# Patient Record
Sex: MALE | Race: WHITE | URBAN - METROPOLITAN AREA
[De-identification: names, ages, dates, MRNs, and addresses within clinical notes are randomized per-mention and may not be internally consistent; named-entity substitution may affect disease eponyms.]

---

## 2017-04-25 ENCOUNTER — OFFICE VISIT (OUTPATIENT)
Dept: CARDIOLOGY | Facility: CLINIC | Age: 82
End: 2017-04-25
Attending: INTERNAL MEDICINE
Payer: MEDICARE

## 2017-04-25 VITALS
DIASTOLIC BLOOD PRESSURE: 62 MMHG | SYSTOLIC BLOOD PRESSURE: 126 MMHG | BODY MASS INDEX: 28.06 KG/M2 | HEART RATE: 56 BPM | WEIGHT: 196 LBS | HEIGHT: 70 IN

## 2017-04-25 DIAGNOSIS — G47.33 OBSTRUCTIVE SLEEP APNEA SYNDROME: ICD-10-CM

## 2017-04-25 DIAGNOSIS — E78.2 MIXED HYPERLIPIDEMIA: ICD-10-CM

## 2017-04-25 DIAGNOSIS — I10 BENIGN ESSENTIAL HYPERTENSION: ICD-10-CM

## 2017-04-25 DIAGNOSIS — I49.3 PVC'S (PREMATURE VENTRICULAR CONTRACTIONS): Primary | ICD-10-CM

## 2017-04-25 PROCEDURE — 99214 OFFICE O/P EST MOD 30 MIN: CPT | Performed by: INTERNAL MEDICINE

## 2017-04-25 RX ORDER — CHLORTHALIDONE 50 MG/1
25 TABLET ORAL DAILY
COMMUNITY

## 2017-04-25 RX ORDER — SIMVASTATIN 10 MG
10 TABLET ORAL AT BEDTIME
COMMUNITY

## 2017-04-25 RX ORDER — LOSARTAN POTASSIUM 100 MG/1
100 TABLET ORAL DAILY
COMMUNITY

## 2017-04-25 NOTE — MR AVS SNAPSHOT
After Visit Summary   4/25/2017    Adryan Avila    MRN: [id number]           Patient Information     Date Of Birth          1935        Visit Information        Provider Department      4/25/2017 9:15 AM Miguel Ángel Watkins MD [facility] [place]        Today's Diagnoses     PVC's (premature ventricular contractions)    -  1    Benign essential hypertension        Mixed hyperlipidemia        Obstructive sleep apnea syndrome           Follow-ups after your visit        Additional Services     Follow-Up with Cardiologist                 Your next 10 appointments already scheduled     Dec 20, 2017  9:30 AM CST   Return Sleep Patient with Star Cortes MD   [facility] ([facility])    [address] [address] [phone]              Future tests that were ordered for you today     Open Future Orders        Priority Expected Expires Ordered    Holter Monitor 24 hour - Adult Routine 4/25/2018 5/30/2018 4/25/2017    Echocardiogram Routine 4/25/2018 5/30/2018 4/25/2017    Follow-Up with Cardiologist Routine 4/25/2018 9/7/2018 4/25/2017            Who to contact     If you have questions or need follow up information about today's clinic visit or your schedule please contact [facility] [place] directly at [phone].  Normal or non-critical lab and imaging results will be communicated to you by MyChart, letter or phone within 4 business days after the clinic has received the results. If you do not hear from us within 7 days, please contact the clinic through MyChart or phone. If you have a critical or abnormal lab result, we will notify you by phone as soon as possible.  Submit refill requests through [employer] or call your pharmacy and they will forward the refill request to us. Please allow 3 business days for your refill to be completed.          Additional  "Information About Your Visit        MyChart Information     [employer] lets you send messages to your doctor, view your test results, renew your prescriptions, schedule appointments and more. To sign up, go to [email or web address] . Click on \"Log in\" on the left side of the screen, which will take you to the Welcome page. Then click on \"Sign up Now\" on the right side of the page.     You will be asked to enter the access code listed below, as well as some personal information. Please follow the directions to create your username and password.     Your access code is: [email or web address]  Expires: 2017  9:33 AM     Your access code will  in 90 days. If you need help or a new code, please call your [place] clinic or [phone].        Care EveryWhere ID     This is your Care EveryWhere ID. This could be used by other organizations to access your [place] medical records  [id number]        Your Vitals Were     Pulse Height BMI (Body Mass Index)             56 1.778 m (5' 10\") 28.12 kg/m2          Blood Pressure from Last 3 Encounters:   17 126/62   16 133/75   16 128/66    Weight from Last 3 Encounters:   17 88.9 kg (196 lb)   16 89.1 kg (196 lb 6.4 oz)   16 89 kg (196 lb 3.2 oz)              We Performed the Following     Follow-Up with Cardiologist        Primary Care Provider Office Phone # Fax #    Martha Charles [phone]       [facility] [address] [facility] [address]        Thank you!     Thank you for choosing [facility] AT [place]  for your care. Our goal is always to provide you with excellent care. Hearing back from our patients is one way we can continue to improve our services. Please take a few minutes to complete the written survey that you may receive in the mail after your visit with us. Thank you!             Your Updated Medication List - Protect others around you: Learn how to safely " use, store and throw away your medicines at www.disposemymeds.org.          This list is accurate as of: 4/25/17  9:33 AM.  Always use your most recent med list.                   Brand Name Dispense Instructions for use    allopurinol 100 MG tablet    ZYLOPRIM     Take 200 mg by mouth daily       amLODIPine 5 MG tablet    NORVASC     Take 5 mg by mouth daily       ASPIRIN EC PO      Take 81 mg by mouth daily       chlorthalidone 50 MG tablet    HYGROTON     Take 25 mg by mouth daily       losartan 100 MG tablet    COZAAR     Take 100 mg by mouth daily       order for DME      DREAMSTATION 5-15 CM/H20 NASAL WISP XL       potassium chloride vinod er    K-DUR     Take 40 mEq by mouth daily 2 x 20 mEq       simvastatin 10 MG tablet    ZOCOR     Take 10 mg by mouth At Bedtime       VITAMIN A PO      Take 10,000 Units by mouth daily

## 2017-04-25 NOTE — PROGRESS NOTES
HISTORY OF PRESENT ILLNESS:  Adryan Avila in followup for his frequent premature ventricular complexes.  He was noticed that last year and had pseudobradycardia with it.  Subsequently, he was referred for a sleep study which showed sleep apnea.  He is now on CPAP mask but he is not using it consistently.  I reinforced the importance of using CPAP mask more consistently as it will decrease the risk of cardiovascular complications down the road.  He has a new mask now and he is hoping to use it more frequently.  He was having some trouble getting to sleep and with Trazodone, this has improved.        His PVCs were also evaluated by Dr. Salinas in the Electrophysiology Service on my request.  He offered an ablation which was performed last year.  The origin of PVCs appeared to be from the posterior papillary muscle region and partial success was obtained.  Now the Holter last year after the ablation had shown 13% burden of PVCs compared to 30% prior to the ablation.  The patient remains asymptomatic.  He walks about 3 miles 3-4 times a week and has had no significant limitations.      His blood pressure is well controlled on losartan and amlodipine as well as chlorthalidone.  He follows with his primary care physician for his labs.  He is on simvastatin for high cholesterol.      PHYSICAL EXAMINATION:   VITAL SIGNS:  Blood pressure 126/62, pulse 56 per minute, slightly irregular because of PVCs.     CARDIOVASCULAR:   No murmurs were heard.   CHEST:  Clear to auscultation.      IMPRESSION:   1.  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 Salinas.  Partial success, the burden of PVCs has decreased from 30% to 13%.  He remains asymptomatic.  I encouraged him to continue using CPAP therapy as directed.  I would suggest we repeat a Holter next year to assess the burden of PVCs as well as an echocardiogram to make sure there is no evidence of cardiomyopathy related to frequent PVCs.  He will return  to see me at that time.   2.  Sleep apnea as above.  I encouraged him to use CPAP as much as possible.   3.  Hypertension.  Continue present medications.  He will follow with me in a year.      cc:      LIDA Carrasco   [address] [address]         SRINATH ASHBY MD             D: 2017 09:45   T: 2017 17:57   MT: RUBIA      Name:     LADARIUS GOMEZ   MRN:      [id number]        Account:      [id number]   :      1935           Service Date: 2017      Document: [id number]

## 2017-04-25 NOTE — PROGRESS NOTES
HPI and Plan:   See dictation  [id number]    Orders Placed This Encounter   Procedures     Follow-Up with Cardiologist     Holter Monitor 24 hour - Adult     Echocardiogram       Orders Placed This Encounter   Medications     chlorthalidone (HYGROTON) 50 MG tablet     Sig: Take 25 mg by mouth daily     losartan (COZAAR) 100 MG tablet     Sig: Take 100 mg by mouth daily     simvastatin (ZOCOR) 10 MG tablet     Sig: Take 10 mg by mouth At Bedtime       Medications Discontinued During This Encounter   Medication Reason     CHLORTHALIDONE PO Therapy completed     LOSARTAN POTASSIUM PO Therapy completed     Multiple Vitamins-Minerals ( MACULAR HEALTH PO) Therapy completed     SIMVASTATIN PO Therapy completed         Encounter Diagnoses   Name Primary?     PVC's (premature ventricular contractions) Yes     Benign essential hypertension      Mixed hyperlipidemia      Obstructive sleep apnea syndrome        CURRENT MEDICATIONS:  Current Outpatient Prescriptions   Medication Sig Dispense Refill     chlorthalidone (HYGROTON) 50 MG tablet Take 25 mg by mouth daily       losartan (COZAAR) 100 MG tablet Take 100 mg by mouth daily       simvastatin (ZOCOR) 10 MG tablet Take 10 mg by mouth At Bedtime       allopurinol (ZYLOPRIM) 100 MG tablet Take 200 mg by mouth daily       amLODIPine (NORVASC) 5 MG tablet Take 5 mg by mouth daily       order for DME DREAMSTATION  5-15 CM/H20  NASAL WISP XL       potassium chloride vinod er (K-DUR) Take 40 mEq by mouth daily 2 x 20 mEq       VITAMIN A PO Take 10,000 Units by mouth daily       ASPIRIN EC PO Take 81 mg by mouth daily       [DISCONTINUED] LOSARTAN POTASSIUM PO Take 100 mg by mouth daily       [DISCONTINUED] SIMVASTATIN PO Take 10 mg by mouth At Bedtime        [DISCONTINUED] CHLORTHALIDONE PO Take 50 mg by mouth daily 2 x 25 mg         ALLERGIES   No Known Allergies    PAST MEDICAL HISTORY:  Past Medical History:   Diagnosis Date     Hypercholesteremia      Hypertension        PAST  "SURGICAL HISTORY:  Past Surgical History:   Procedure Laterality Date     ENT SURGERY      perforated ear drum 1985     HERNIA REPAIR         FAMILY HISTORY:  Family History   Problem Relation Age of Onset     Family History Negative Mother      Other - See Comments Father      pulmonary rosanne       SOCIAL HISTORY:  Social History     Social History     Marital status:      Spouse name: N/A     Number of children: N/A     Years of education: N/A     Social History Main Topics     Smoking status: Former Smoker     Packs/day: 0.50     Years: 20.00     Types: Cigarettes     Smokeless tobacco: Never Used      Comment: quit 1970     Alcohol use No     Drug use: No     Sexual activity: Not Asked     Other Topics Concern     Parent/Sibling W/ Cabg, Mi Or Angioplasty Before 65f 55m? No     Caffeine Concern No     coffee: 2 cups a day     Sleep Concern No     Stress Concern No     Weight Concern No     Special Diet No     Exercise Yes     walking 3 miles 5-6 days a week     Seat Belt Yes     Social History Narrative       Review of Systems:  Skin:  Negative       Eyes:  Negative      ENT:  Negative      Respiratory:  Positive for sleep apnea has only used the CPAP 3 times in the last 3 months    Cardiovascular:  Negative      Gastroenterology: Negative      Genitourinary:  Negative      Musculoskeletal:  Negative      Neurologic:  Negative      Psychiatric:  Negative      Heme/Lymph/Imm:  Negative      Endocrine:  Negative        Physical Exam:  Vitals: /62  Pulse 56  Ht 1.778 m (5' 10\")  Wt 88.9 kg (196 lb)  BMI 28.12 kg/m2    Constitutional:  cooperative;alert and oriented        Skin:  warm and dry to the touch        Head:  normocephalic        Eyes:  pupils equal and round        ENT:  no pallor or cyanosis, dentition good        Neck:  carotid pulses are full and equal bilaterally, JVP normal, no carotid bruit, no thyromegaly        Chest:  clear to auscultation          Cardiac: normal S1 and S2 " occasional premature beats   no presence of murmur            Abdomen:  abdomen soft, non-tender, BS normoactive, no mass, no HSM, no bruits        Vascular: pulses full and equal, no bruits auscultated                                        Extremities and Back:  no deformities, clubbing, cyanosis, erythema observed              Neurological:  affect appropriate, oriented to time, person and place              CC  Miguel Ángel Watkins MD   [facility]  [address]  [address]  [place] [address]

## 2017-04-25 NOTE — LETTER
4/25/2017    Martha Charles MD  [facility]   [address] [facility] [address]    RE: Adryan Jamauire       Dear Colleague,    I had the pleasure of seeing Adryan Avila in the [facility].    Adryan Avila in followup for his frequent premature ventricular complexes.  He was noticed that last year and had pseudobradycardia with it.  Subsequently, he was referred for a sleep study which showed sleep apnea.  He is now on CPAP mask but he is not using it consistently.  I reinforced the importance of using CPAP mask more consistently as it will decrease the risk of cardiovascular complications down the road.  He has a new mask now and he is hoping to use it more frequently.  He was having some trouble getting to sleep and with Trazodone, this has improved.        His PVCs were also evaluated by Dr. Salinas in the Electrophysiology Service on my request.  He offered an ablation which was performed last year.  The origin of PVCs appeared to be from the posterior papillary muscle region and partial success was obtained.  Now the Holter last year after the ablation had shown 13% burden of PVCs compared to 30% prior to the ablation.  The patient remains asymptomatic.  He walks about 3 miles 3-4 times a week and has had no significant limitations.      His blood pressure is well controlled on losartan and amlodipine as well as chlorthalidone.  He follows with his primary care physician for his labs.  He is on simvastatin for high cholesterol.      PHYSICAL EXAMINATION:   VITAL SIGNS:  Blood pressure 126/62, pulse 56 per minute, slightly irregular because of PVCs.     CARDIOVASCULAR:   No murmurs were heard.   CHEST:  Clear to auscultation.     Outpatient Encounter Prescriptions as of 4/25/2017   Medication Sig Dispense Refill     chlorthalidone (HYGROTON) 50 MG tablet Take 25 mg by mouth daily       losartan (COZAAR) 100 MG tablet Take 100 mg by mouth daily       simvastatin  (ZOCOR) 10 MG tablet Take 10 mg by mouth At Bedtime       allopurinol (ZYLOPRIM) 100 MG tablet Take 200 mg by mouth daily       amLODIPine (NORVASC) 5 MG tablet Take 5 mg by mouth daily       order for DME DREAMSTATION  5-15 CM/H20  NASAL WISP XL       potassium chloride vinod er (K-DUR) Take 40 mEq by mouth daily 2 x 20 mEq       VITAMIN A PO Take 10,000 Units by mouth daily       ASPIRIN EC PO Take 81 mg by mouth daily       [DISCONTINUED] LOSARTAN POTASSIUM PO Take 100 mg by mouth daily       [DISCONTINUED] SIMVASTATIN PO Take 10 mg by mouth At Bedtime        [DISCONTINUED] CHLORTHALIDONE PO Take 50 mg by mouth daily 2 x 25 mg       [DISCONTINUED] Multiple Vitamins-Minerals ( MACULAR HEALTH PO) Take 2 capsules by mouth daily Focus [employer] eye vitamin brand       No facility-administered encounter medications on file as of 4/25/2017.       IMPRESSION:   1.  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 Salinas.  Partial success, the burden of PVCs has decreased from 30% to 13%.  He remains asymptomatic.  I encouraged him to continue using CPAP therapy as directed.  I would suggest we repeat a Holter next year to assess the burden of PVCs as well as an echocardiogram to make sure there is no evidence of cardiomyopathy related to frequent PVCs.  He will return to see me at that time.   2.  Sleep apnea as above.  I encouraged him to use CPAP as much as possible.   3.  Hypertension.  Continue present medications.  He will follow with me in a year.     Again, thank you for allowing me to participate in the care of your patient.      Sincerely,    Miguel Ángel Watkins MD     [facility]

## 2017-05-26 ENCOUNTER — DOCUMENTATION ONLY (OUTPATIENT)
Dept: SLEEP MEDICINE | Facility: CLINIC | Age: 82
End: 2017-05-26

## 2017-05-26 NOTE — PROGRESS NOTES
6 Month [facility] visit    Subjective measures:  Pt states things are going well.  Pt is benefiting from therapy.  He is having some issues with dry mouth.      Objective measures: 14 day rolling measures     Device settings:    EPAP Min Auto CPAP: 5 (CPAP Min Auto CPAP)    EPAP Max Auto CPAP: 15 (CPAP Max Auto CPAP)    Avg EPAP pressure (90th %ile) 14 day average (Kenan): 8.7cm H20    Objective measures: 14 day rolling measures      Compliance   (Goal >70%)  % compliance greater than four hours rolling average 14 days: 64 %      Leak   (Goal < 10%)  Average % of night in large leak Rolling Average 14 days (KENAN): 1%       AHI  (Goal < 5)  AHI Rolling Average 14 Day: 1.5      Usage  (Goal >240)  Time mask on face 14 day average: 289 min    Assessment:Pt not meeting objective benchmarks for compliance (only a little shy) Patient failing following subjective benchmarks:dryness or soreness  Action plan: Increased humidity remotely and instructed pt to call if he continues to have issues with dryness.  pt to follow up per provider request (1-2 yrs)

## 2017-06-26 ENCOUNTER — TRANSFERRED RECORDS (OUTPATIENT)
Dept: CARDIOLOGY | Facility: CLINIC | Age: 82
End: 2017-06-26

## 2017-06-27 ENCOUNTER — TELEPHONE (OUTPATIENT)
Dept: CARDIOLOGY | Facility: CLINIC | Age: 82
End: 2017-06-27

## 2017-06-27 NOTE — TELEPHONE ENCOUNTER
Pt called in with c/o low heart rate he said when he took pulse it was only 32-35. Pt said he went to ER at [facility] and they monitored him for two hours. Per Pt they said he looked about the same as what had been documented in chart. Informed Pt  That if he has a fair amout of  PVC's it will be hard to get accurate HR and will probable look like it is low. Informed Pt will try to get records frow [facility]. ZEINAB Balderas rN

## 2017-06-27 NOTE — TELEPHONE ENCOUNTER
Called Pt informed him per records from [facility] his HR ventricular rate 55. Offered Pt appointment with EP and he declined. Pt said he felt fine and now that hew knew it is hard to get accurate HR with PVC's he was not so concerned. Records sent to curt. ZEINAB Balderas RN

## 2017-12-04 DIAGNOSIS — G47.33 OBSTRUCTIVE SLEEP APNEA SYNDROME: Primary | ICD-10-CM

## 2017-12-11 ENCOUNTER — OFFICE VISIT (OUTPATIENT)
Dept: CARDIOLOGY | Facility: CLINIC | Age: 82
End: 2017-12-11
Attending: INTERNAL MEDICINE
Payer: MEDICARE

## 2017-12-11 VITALS
SYSTOLIC BLOOD PRESSURE: 104 MMHG | DIASTOLIC BLOOD PRESSURE: 64 MMHG | HEIGHT: 70 IN | HEART RATE: 60 BPM | WEIGHT: 184 LBS | BODY MASS INDEX: 26.34 KG/M2

## 2017-12-11 DIAGNOSIS — I49.3 PVC'S (PREMATURE VENTRICULAR CONTRACTIONS): ICD-10-CM

## 2017-12-11 PROCEDURE — 99213 OFFICE O/P EST LOW 20 MIN: CPT | Performed by: PHYSICIAN ASSISTANT

## 2017-12-11 PROCEDURE — 93000 ELECTROCARDIOGRAM COMPLETE: CPT | Performed by: PHYSICIAN ASSISTANT

## 2017-12-11 NOTE — LETTER
"12/11/2017    Martha Charles MD  [facility]   [address] [facility] [address]    RE: Adryan Avila       Dear Colleague,    I had the pleasure of seeing Adryan HORAN Austin in the [facility].    HISTORY OF PRESENT ILLNESS:  I had the pleasure of seeing Manav today when he came for routine followup.  He is a very pleasant 82 year old who sees Dr. Watkins routinely.  Dr. Watkins sent him to Dr. Salinas for frequent PVCs associated with pseudobradycardia.  Dr. Salinas took him to the EP Lab 11/14/2016.  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  Followup Holter monitor showed a reduction in his PVC burden from 34% down to 13%. As he was doing well, no further ablation was planned, and he has continued routine followup.     He has sleep apnea and when Dr. Watkins saw him back in April he encouraged him to use his CPAP more often.  He has done so despite the fact he \"hates it.\"  He now uses his CPAP roughly 6-7 nights per week.       Manav tells me that he has done well since he last saw Dr. Watkins.  Specifically, he continues to walk 3 miles every day without any limitation.  He denies edema, orthopnea or PND.  He does note some mild orthostasis, but has never had any dizziness or lightheadedness once he starts moving or while sitting.  He has not had any syncope or falls.  Overall, he is doing well with no complaints.      EKG today, which I overread, showed a sinus rhythm of 67 beats per minute.  He had 1 PAC noted.  I did not see any PVCs.     Outpatient Encounter Prescriptions as of 12/11/2017   Medication Sig Dispense Refill     chlorthalidone (HYGROTON) 50 MG tablet Take 25 mg by mouth daily       losartan (COZAAR) 100 MG tablet Take 100 mg by mouth daily       simvastatin (ZOCOR) 10 MG tablet Take 10 mg by mouth At Bedtime       allopurinol (ZYLOPRIM) 100 MG tablet Take 200 mg by mouth " daily       amLODIPine (NORVASC) 5 MG tablet Take 5 mg by mouth daily       order for DME DREAMSTATION  5-15 CM/H20  NASAL WISP XL       potassium chloride vinod er (K-DUR) Take 40 mEq by mouth daily 2 x 20 mEq       VITAMIN A PO Take 10,000 Units by mouth daily       ASPIRIN EC PO Take 81 mg by mouth daily       No facility-administered encounter medications on file as of 12/11/2017.       ASSESSMENT AND PLAN:     1.  PVCs.  PVC burden improved after Dr. Salinas took him to the lab and ablated posterolateral papillary muscle PVC foci.  He has had some residual PVCs with the same morphology, but really is doing well.      He is seeing Dr. Watkins in 04/2018 and will get a Holter monitor and echocardiogram at that time.  Certainly, EP would be able to see him if needed.       2.  Weight loss.  This has been intentional with a reduction in caloric intake.     3.  Hypertension.  Blood pressure is actually on the low side, but he is doing well with it.  He has some mild orthostasis, but overall is stable and steady.     4.  Obstructive sleep apnea.  I have encouraged him to continue using the CPAP, as I do feel this contributed to an improvement in his ectopy.     Sincerely,    Sherri Stover PA-C     [facility]

## 2017-12-11 NOTE — MR AVS SNAPSHOT
After Visit Summary   12/11/2017    Adryan Avila    MRN: [id number]           Patient Information     Date Of Birth          1935        Visit Information        Provider Department      12/11/2017 10:30 AM Sherri Stover PA-C [facility]        Today's Diagnoses     PVC's (premature ventricular contractions)          Care Instructions    1. EKG today showed normal rhythm with VERY FEW funny heart beats!    2. See Dr. Watkins when you return from [place]  - he wants to get 24 hour Monitor and echocardiogram and see you 4/2018.  CALL if any issues beforehand!  [phone]    3. Keep using CPAP!!!          Follow-ups after your visit        Your next 10 appointments already scheduled     Dec 20, 2017  9:30 AM CST   Return Sleep Patient with Star oCrtes MD   [facility] ([facility])    [address] [id number] [phone]              Who to contact     If you have questions or need follow up information about today's clinic visit or your schedule please contact [facility] directly at [phone].  Normal or non-critical lab and imaging results will be communicated to you by MyChart, letter or phone within 4 business days after the clinic has received the results. If you do not hear from us within 7 days, please contact the clinic through MyChart or phone. If you have a critical or abnormal lab result, we will notify you by phone as soon as possible.  Submit refill requests through [employer] or call your pharmacy and they will forward the refill request to us. Please allow 3 business days for your refill to be completed.          Additional Information About Your Visit        [employer] Information     [employer] lets you send messages to your doctor, view your test results, renew your prescriptions, schedule appointments and more. To sign up, go to  [email or web address] . Click on \"Log in\" on the left side of the screen, which will take you to the Welcome page. Then click on \"Sign up Now\" on the right side of the page.     You will be asked to enter the access code listed below, as well as some personal information. Please follow the directions to create your username and password.     Your access code is: [email or web address]  Expires: 3/11/2018 10:44 AM     Your access code will  in 90 days. If you need help or a new code, please call your [place] clinic or [phone].        Care EveryWhere ID     This is your Care EveryWhere ID. This could be used by other organizations to access your [place] medical records  [id number]        Your Vitals Were     Pulse Height BMI (Body Mass Index)             60 1.778 m (5' 10\") 26.4 kg/m2          Blood Pressure from Last 3 Encounters:   17 104/64   17 126/62   16 133/75    Weight from Last 3 Encounters:   17 83.5 kg (184 lb)   17 88.9 kg (196 lb)   16 89.1 kg (196 lb 6.4 oz)              We Performed the Following     EKG 12-lead complete w/read - Clinics (performed today)     Follow-Up with Cardiac Advanced Practice Provider        Primary Care Provider Office Phone # Fax #    Martha Charles [phone]       [place] [address] [facility] [address]        Equal Access to Services     [facility] : Hadii naveen saxena hadasho Sohelena, waaxda luqadaha, qaybta kaalmada maty, magy boudreaux . So [facility] [phone].    ATENCIÓN: Si habla español, tiene a white disposición servicios gratuitos de asistencia lingüística. Llame al [phone].    We comply with applicable federal civil rights laws and [place] laws. We do not discriminate on the basis of race, color, national origin, age, disability, sex, sexual orientation, or gender identity.            Thank you!     Thank you for choosing [facility]   [place]  for your care. Our goal is always to provide you with excellent care. Hearing back from our patients is one way we can continue to improve our services. Please take a few minutes to complete the written survey that you may receive in the mail after your visit with us. Thank you!             Your Updated Medication List - Protect others around you: Learn how to safely use, store and throw away your medicines at www.disposemymeds.org.          This list is accurate as of: 12/11/17 10:44 AM.  Always use your most recent med list.                   Brand Name Dispense Instructions for use Diagnosis    allopurinol 100 MG tablet    ZYLOPRIM     Take 200 mg by mouth daily        amLODIPine 5 MG tablet    NORVASC     Take 5 mg by mouth daily        ASPIRIN EC PO      Take 81 mg by mouth daily        chlorthalidone 50 MG tablet    HYGROTON     Take 25 mg by mouth daily        losartan 100 MG tablet    COZAAR     Take 100 mg by mouth daily        order for DME      DREAMSTATION 5-15 CM/H20 NASAL WISP XL        potassium chloride vinod er    K-DUR     Take 40 mEq by mouth daily 2 x 20 mEq        simvastatin 10 MG tablet    ZOCOR     Take 10 mg by mouth At Bedtime        VITAMIN A PO      Take 10,000 Units by mouth daily

## 2017-12-11 NOTE — PROGRESS NOTES
"HISTORY OF PRESENT ILLNESS:  I had the pleasure of seeing Manav today when he came for routine followup.  He is a very pleasant 82 year old who sees Dr. Watkins routinely.  Dr. Watkins sent him to Dr. Salinas for frequent PVCs associated with pseudobradycardia.  Dr. Salinas took him to the EP Lab 11/14/2016.  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  Followup Holter monitor showed a reduction in his PVC burden from 34% down to 13%. As he was doing well, no further ablation was planned, and he has continued routine followup.     He has sleep apnea and when Dr. Watkins saw him back in April he encouraged him to use his CPAP more often.  He has done so despite the fact he \"hates it.\"  He now uses his CPAP roughly 6-7 nights per week.       Manav tells me that he has done well since he last saw Dr. Watkins.  Specifically, he continues to walk 3 miles every day without any limitation.  He denies edema, orthopnea or PND.  He does note some mild orthostasis, but has never had any dizziness or lightheadedness once he starts moving or while sitting.  He has not had any syncope or falls.  Overall, he is doing well with no complaints.      EKG today, which I overread, showed a sinus rhythm of 67 beats per minute.  He had 1 PAC noted.  I did not see any PVCs.        ASSESSMENT AND PLAN:     1.  PVCs.  PVC burden improved after Dr. Salinas took him to the lab and ablated posterolateral papillary muscle PVC foci.  He has had some residual PVCs with the same morphology, but really is doing well.      He is seeing Dr. Watkins in 04/2018 and will get a Holter monitor and echocardiogram at that time.  Certainly, EP would be able to see him if needed.       2.  Weight loss.  This has been intentional with a reduction in caloric intake.     3.  Hypertension.  Blood pressure is actually on the low side, but he is doing well with it.  He has some mild orthostasis, but " overall is stable and steady.     4.  Obstructive sleep apnea.  I have encouraged him to continue using the CPAP, as I do feel this contributed to an improvement in his ectopy.         JO SIMMONS PA-C             D: 2017 10:56   T: 2017 11:44   MT: edison      Name:     LADARIUS GOMEZ   MRN:      [id number]        Account:      [id number]   :      1935           Service Date: 2017      Document: [id number]

## 2017-12-11 NOTE — PROGRESS NOTES
HPI and Plan:   See dictation #[id number]    Orders Placed This Encounter   Procedures     EKG 12-lead complete w/read - Clinics (performed today)       No orders of the defined types were placed in this encounter.      There are no discontinued medications.      Encounter Diagnosis   Name Primary?     PVC's (premature ventricular contractions)        CURRENT MEDICATIONS:  Current Outpatient Prescriptions   Medication Sig Dispense Refill     chlorthalidone (HYGROTON) 50 MG tablet Take 25 mg by mouth daily       losartan (COZAAR) 100 MG tablet Take 100 mg by mouth daily       simvastatin (ZOCOR) 10 MG tablet Take 10 mg by mouth At Bedtime       allopurinol (ZYLOPRIM) 100 MG tablet Take 200 mg by mouth daily       amLODIPine (NORVASC) 5 MG tablet Take 5 mg by mouth daily       order for DME DREAMSTATION  5-15 CM/H20  NASAL WISP XL       potassium chloride vinod er (K-DUR) Take 40 mEq by mouth daily 2 x 20 mEq       VITAMIN A PO Take 10,000 Units by mouth daily       ASPIRIN EC PO Take 81 mg by mouth daily         ALLERGIES   No Known Allergies    PAST MEDICAL HISTORY:  Past Medical History:   Diagnosis Date     Hypercholesteremia      Hypertension        PAST SURGICAL HISTORY:  Past Surgical History:   Procedure Laterality Date     ENT SURGERY      perforated ear drum 1985     HERNIA REPAIR         FAMILY HISTORY:  Family History   Problem Relation Age of Onset     Family History Negative Mother      Other - See Comments Father      pulmonary rosanne       SOCIAL HISTORY:  Social History     Social History     Marital status:      Spouse name: N/A     Number of children: N/A     Years of education: N/A     Social History Main Topics     Smoking status: Former Smoker     Packs/day: 0.50     Years: 20.00     Types: Cigarettes     Smokeless tobacco: Never Used      Comment: quit 1970     Alcohol use No     Drug use: No     Sexual activity: Not Asked     Other Topics Concern     Parent/Sibling W/ Cabg, Mi Or Angioplasty  "Before 65f 55m? No     Caffeine Concern No     coffee: 2 cups a day     Sleep Concern No     Stress Concern No     Weight Concern No     Special Diet No     Exercise Yes     walking 3 miles 5-6 days a week     Seat Belt Yes     Social History Narrative       Review of Systems:  Skin:  Negative       Eyes:  Negative      ENT:  Negative      Respiratory:  Positive for sleep apnea;CPAP     Cardiovascular:  Negative for;palpitations;chest pain;lightheadedness;dizziness;edema;exercise intolerance;fatigue;syncope or near-syncope      Gastroenterology: Negative      Genitourinary:  Negative      Musculoskeletal:  Negative      Neurologic:  Negative      Psychiatric:  Negative      Heme/Lymph/Imm:  Negative      Endocrine:  Negative        Physical Exam:  Vitals: /64  Pulse 60  Ht 1.778 m (5' 10\")  Wt 83.5 kg (184 lb)  BMI 26.4 kg/m2    Constitutional:  cooperative, alert and oriented, well developed, well nourished, in no acute distress        Skin:  warm and dry to the touch          Head:  normocephalic        Eyes:  pupils equal and round;conjunctivae and lids unremarkable;sclera white;no xanthalasma        Lymph:      ENT:  no pallor or cyanosis, dentition good        Neck:           Respiratory:  clear to auscultation         Cardiac: normal S1 and S2;regular rhythm;no S3 or S4;no murmurs, gallops or rubs detected     no presence of murmur          pulses full and equal                                        GI:  abdomen soft        Extremities and Muscular Skeletal:  no deformities, clubbing, cyanosis, erythema observed;no edema              Neurological:  no gross motor deficits        Psych:  Alert and Oriented x 3                "

## 2017-12-11 NOTE — PATIENT INSTRUCTIONS
1. EKG today showed normal rhythm with VERY FEW funny heart beats!    2. See Dr. Watkins when you return from [place]  - he wants to get 24 hour Monitor and echocardiogram and see you 4/2018.  CALL if any issues beforehand!  [phone]    3. Keep using CPAP!!!

## 2017-12-15 ENCOUNTER — DOCUMENTATION ONLY (OUTPATIENT)
Dept: CARDIOLOGY | Facility: CLINIC | Age: 82
End: 2017-12-15

## 2017-12-15 NOTE — PROGRESS NOTES
Carrillo Watkins!     Just FYI - I saw Mr. Avila in routine FU for Dr. Salinas. He's s/p partially successful PVC ablation. Continues to feel great and walks 3 miles per day.    Last time you saw him you rec'd he see you back in 4/2018 with repeat Holter and echo.    Pls let us know when/if you would like EP to see again.    Emily Tomas

## 2017-12-20 ENCOUNTER — OFFICE VISIT (OUTPATIENT)
Dept: SLEEP MEDICINE | Facility: CLINIC | Age: 82
End: 2017-12-20
Payer: MEDICARE

## 2017-12-20 VITALS
DIASTOLIC BLOOD PRESSURE: 63 MMHG | OXYGEN SATURATION: 97 % | HEART RATE: 56 BPM | BODY MASS INDEX: 26.63 KG/M2 | SYSTOLIC BLOOD PRESSURE: 122 MMHG | HEIGHT: 70 IN | WEIGHT: 186 LBS | RESPIRATION RATE: 16 BRPM

## 2017-12-20 DIAGNOSIS — G47.33 OSA (OBSTRUCTIVE SLEEP APNEA): Primary | ICD-10-CM

## 2017-12-20 PROCEDURE — 99213 OFFICE O/P EST LOW 20 MIN: CPT | Performed by: INTERNAL MEDICINE

## 2017-12-20 NOTE — MR AVS SNAPSHOT
After Visit Summary   12/20/2017    Adryan Avila    MRN: [id number]           Patient Information     Date Of Birth          1935        Visit Information        Provider Department      12/20/2017 9:30 AM Star Cortes MD [facility]        Today's Diagnoses     EZEQUIEL (obstructive sleep apnea)    -  1      Care Instructions      Your BMI is Body mass index is 26.69 kg/(m^2).  Weight management is a personal decision.  If you are interested in exploring weight loss strategies, the following discussion covers the approaches that may be successful. Body mass index (BMI) is one way to tell whether you are at a healthy weight, overweight, or obese. It measures your weight in relation to your height.  A BMI of 18.5 to 24.9 is in the healthy range. A person with a BMI of 25 to 29.9 is considered overweight, and someone with a BMI of 30 or greater is considered obese. More than two-thirds of American adults are considered overweight or obese.  Being overweight or obese increases the risk for further weight gain. Excess weight may lead to heart disease and diabetes.  Creating and following plans for healthy eating and physical activity may help you improve your health.  Weight control is part of healthy lifestyle and includes exercise, emotional health, and healthy eating habits. Careful eating habits lifelong are the mainstay of weight control. Though there are significant health benefits from weight loss, long-term weight loss with diet alone may be very difficult to achieve- studies show long-term success with dietary management in less than 10% of people. Attaining a healthy weight may be especially difficult to achieve in those with severe obesity. In some cases, medications, devices and surgical management might be considered.  What can you do?  If you are overweight or obese and are interested in methods for weight loss, you should discuss this with your provider.     Consider  reducing daily calorie intake by 500 calories.     Keep a food journal.     Avoiding skipping meals, consider cutting portions instead.    Diet combined with exercise helps maintain muscle while optimizing fat loss. Strength training is particularly important for building and maintaining muscle mass. Exercise helps reduce stress, increase energy, and improves fitness. Increasing exercise without diet control, however, may not burn enough calories to loose weight.       Start walking three days a week 10-20 minutes at a time    Work towards walking thirty minutes five days a week     Eventually, increase the speed of your walking for 1-2 minutes at time    In addition, we recommend that you review healthy lifestyles and methods for weight loss available through the National Institutes of Health patient information sites:  http://win.niddk.nih.gov/publications/index.htm    And look into health and wellness programs that may be available through your health insurance provider, employer, local community center, or van club.    Weight management plan: Patient was referred to their PCP to discuss a diet and exercise plan.        Your Body mass index is 26.69 kg/(m^2).  Weight management is a personal decision.  If you are interested in exploring weight loss strategies, the following discussion covers the approaches that may be successful. Body mass index (BMI) is one way to tell whether you are at a healthy weight, overweight, or obese. It measures your weight in relation to your height.  A BMI of 18.5 to 24.9 is in the healthy range. A person with a BMI of 25 to 29.9 is considered overweight, and someone with a BMI of 30 or greater is considered obese. More than two-thirds of American adults are considered overweight or obese.  Being overweight or obese increases the risk for further weight gain. Excess weight may lead to heart disease and diabetes.  Creating and following plans for healthy eating and physical activity  may help you improve your health.  Weight control is part of healthy lifestyle and includes exercise, emotional health, and healthy eating habits. Careful eating habits lifelong are the mainstay of weight control. Though there are significant health benefits from weight loss, long-term weight loss with diet alone may be very difficult to achieve- studies show long-term success with dietary management in less than 10% of people. Attaining a healthy weight may be especially difficult to achieve in those with severe obesity. In some cases, medications, devices and surgical management might be considered.  What can you do?  If you are overweight or obese and are interested in methods for weight loss, you should discuss this with your provider.     Consider reducing daily calorie intake by 500 calories.     Keep a food journal.     Avoiding skipping meals, consider cutting portions instead.    Diet combined with exercise helps maintain muscle while optimizing fat loss. Strength training is particularly important for building and maintaining muscle mass. Exercise helps reduce stress, increase energy, and improves fitness. Increasing exercise without diet control, however, may not burn enough calories to loose weight.       Start walking three days a week 10-20 minutes at a time    Work towards walking thirty minutes five days a week     Eventually, increase the speed of your walking for 1-2 minutes at time    In addition, we recommend that you review healthy lifestyles and methods for weight loss available through the National Institutes of Health patient information sites:  http://win.niddk.nih.gov/publications/index.htm    And look into health and wellness programs that may be available through your health insurance provider, employer, local community center, or van club.    Weight management plan: Patient was referred to their PCP to discuss a diet and exercise plan.            Follow-ups after your visit        Who  "to contact     If you have questions or need follow up information about today's clinic visit or your schedule please contact [facility] directly at [phone].  Normal or non-critical lab and imaging results will be communicated to you by MyChart, letter or phone within 4 business days after the clinic has received the results. If you do not hear from us within 7 days, please contact the clinic through MyChart or phone. If you have a critical or abnormal lab result, we will notify you by phone as soon as possible.  Submit refill requests through [employer] or call your pharmacy and they will forward the refill request to us. Please allow 3 business days for your refill to be completed.          Additional Information About Your Visit        [employer] Information     [employer] lets you send messages to your doctor, view your test results, renew your prescriptions, schedule appointments and more. To sign up, go to [email or web address] . Click on \"Log in\" on the left side of the screen, which will take you to the Welcome page. Then click on \"Sign up Now\" on the right side of the page.     You will be asked to enter the access code listed below, as well as some personal information. Please follow the directions to create your username and password.     Your access code is: [email or web address]  Expires: 3/11/2018 10:44 AM     Your access code will  in 90 days. If you need help or a new code, please call your [place] clinic or [phone].        Care EveryWhere ID     This is your Care EveryWhere ID. This could be used by other organizations to access your [place] medical records  [id number]        Your Vitals Were     Pulse Respirations Height Pulse Oximetry BMI (Body Mass Index)       56 16 1.778 m (5' 10\") 97% 26.69 kg/m2        Blood Pressure from Last 3 Encounters:   17 122/63   17 104/64   17 126/62    Weight from Last 3 Encounters:   17 84.4 kg (186 lb)   17 83.5 " kg (184 lb)   04/25/17 88.9 kg (196 lb)              Today, you had the following     No orders found for display         Today's Medication Changes          These changes are accurate as of: 12/20/17  9:49 AM.  If you have any questions, ask your nurse or doctor.               Stop taking these medicines if you haven't already. Please contact your care team if you have questions.     VITAMIN A PO                    Primary Care Provider Office Phone # Fax #    Martha Charles [phone]       [facility] [address] [facility] [address]        Equal Access to Services     [facility]: Hadii naveen saxena hadasho Sohelena, waaxda luqadaha, qaybta kaalmamargaret rutledge, magy boudreaux . So [facility] [phone].    ATENCIÓN: Si habla español, tiene a white disposición servicios gratuitos de asistencia lingüística. [facility] [phone].    We comply with applicable federal civil rights laws and [place] laws. We do not discriminate on the basis of race, color, national origin, age, disability, sex, sexual orientation, or gender identity.            Thank you!     Thank you for choosing [facility]  for your care. Our goal is always to provide you with excellent care. Hearing back from our patients is one way we can continue to improve our services. Please take a few minutes to complete the written survey that you may receive in the mail after your visit with us. Thank you!             Your Updated Medication List - Protect others around you: Learn how to safely use, store and throw away your medicines at www.disposemymeds.org.          This list is accurate as of: 12/20/17  9:49 AM.  Always use your most recent med list.                   Brand Name Dispense Instructions for use Diagnosis    allopurinol 100 MG tablet    ZYLOPRIM     Take 200 mg by mouth daily        amLODIPine 5 MG tablet    NORVASC     Take 5 mg by mouth daily        ASPIRIN EC PO       Take 81 mg by mouth daily        chlorthalidone 50 MG tablet    HYGROTON     Take 25 mg by mouth daily        losartan 100 MG tablet    COZAAR     Take 100 mg by mouth daily        order for DME      DREAMSTATION 5-15 CM/H20 NASAL WISP XL        potassium chloride vinod er    K-DUR     Take 40 mEq by mouth daily 2 x 20 mEq        simvastatin 10 MG tablet    ZOCOR     Take 10 mg by mouth At Bedtime

## 2017-12-20 NOTE — PATIENT INSTRUCTIONS

## 2017-12-20 NOTE — NURSING NOTE
"Chief Complaint   Patient presents with     CPAP Follow Up     Follow up sarah       Initial /63  Pulse 56  Resp 16  Ht 1.778 m (5' 10\")  Wt 84.4 kg (186 lb)  SpO2 97%  BMI 26.69 kg/m2 Estimated body mass index is 26.69 kg/(m^2) as calculated from the following:    Height as of this encounter: 1.778 m (5' 10\").    Weight as of this encounter: 84.4 kg (186 lb).  Medication Reconciliation: complete   ESS 4  Payton Paula MA      "

## 2017-12-20 NOTE — PROGRESS NOTES
"  Obstructive Sleep Apnea - PAP Follow-Up Visit:    Chief Complaint   Patient presents with     CPAP Follow Up     Follow up sarah       Adryan Avila comes in today for follow-up of their moderate sleep apnea, managed with CPAP.     Overall, he rates the experience with PAP as 10 (0 poor, 10 great). The mask is comfortable. The mask is not leaking.  He is not snoring with the mask on. He is not having gasp arousals.  He is not having significant oral/nasal dryness. The pressure settings are comfortable.     He uses nasal mask.     Bedtime is typically 10 pm. Usually it takes about 20 minutes to fall asleep with the mask on. Wake time is typically 5 am.  Patient is using PAP therapy 5-6 hours per night. The patient is usually getting 6-7 hours of sleep per night.    He does feel rested in the morning.    Total score - [place]: 4 (12/20/2017  9:37 AM)      Respironics    Auto-PAP 5-15 cmH2O download:  30/30 total days of use. 0 nonuse days.  Average use 5 hours 20 minutes per day.  83% days with >4 hours use.  Large leak 38 secs per day average. CPAP 90% pressure 8.4cm. AHI 1.2        Reviewed by team: Allergies  Meds       Reviewed by provider:        Problem List:  Patient Active Problem List    Diagnosis Date Noted     PVC's (premature ventricular contractions) 04/25/2017     Priority: Medium     Benign essential hypertension 04/25/2017     Priority: Medium     Mixed hyperlipidemia 04/25/2017     Priority: Medium     Obstructive sleep apnea syndrome 04/25/2017     Priority: Medium     Bradycardia 10/03/2016     Priority: Medium          /63  Pulse 56  Resp 16  Ht 1.778 m (5' 10\")  Wt 84.4 kg (186 lb)  SpO2 97%  BMI 26.69 kg/m2    Impression/Plan:     Moderate sleep apnea.     - Tolerating PAP well. Daytime symptoms are improved. Regular compliance and normal AHI seen on download.     Plan:     1. Continue auto PAP therapy     Adryan Avila will follow up in about 1 year(s).     Fifteen minutes " spent with patient, all of which were spent face-to-face counseling, consulting, coordinating plan of care.      Star Cortes MD, MD    CC:  Martha Charles,

## 2018-04-26 ENCOUNTER — HOSPITAL ENCOUNTER (OUTPATIENT)
Dept: CARDIOLOGY | Facility: CLINIC | Age: 83
End: 2018-04-26
Attending: INTERNAL MEDICINE
Payer: MEDICARE

## 2018-04-26 ENCOUNTER — HOSPITAL ENCOUNTER (OUTPATIENT)
Dept: CARDIOLOGY | Facility: CLINIC | Age: 83
Discharge: HOME | End: 2018-04-26
Attending: INTERNAL MEDICINE | Admitting: INTERNAL MEDICINE
Payer: MEDICARE

## 2018-04-26 DIAGNOSIS — I49.3 PVC'S (PREMATURE VENTRICULAR CONTRACTIONS): ICD-10-CM

## 2018-04-26 PROCEDURE — 93306 TTE W/DOPPLER COMPLETE: CPT

## 2018-04-26 PROCEDURE — 93227 XTRNL ECG REC<48 HR R&I: CPT | Performed by: INTERNAL MEDICINE

## 2018-04-26 PROCEDURE — 93225 XTRNL ECG REC<48 HRS REC: CPT

## 2018-04-26 PROCEDURE — 93306 TTE W/DOPPLER COMPLETE: CPT | Mod: 26 | Performed by: INTERNAL MEDICINE

## 2018-04-30 ENCOUNTER — OFFICE VISIT (OUTPATIENT)
Dept: SLEEP MEDICINE | Facility: CLINIC | Age: 83
End: 2018-04-30
Payer: MEDICARE

## 2018-04-30 VITALS
HEART RATE: 59 BPM | DIASTOLIC BLOOD PRESSURE: 72 MMHG | SYSTOLIC BLOOD PRESSURE: 120 MMHG | HEIGHT: 70 IN | WEIGHT: 191.6 LBS | OXYGEN SATURATION: 96 % | BODY MASS INDEX: 27.43 KG/M2 | RESPIRATION RATE: 16 BRPM

## 2018-04-30 DIAGNOSIS — G47.33 OBSTRUCTIVE SLEEP APNEA SYNDROME: Primary | ICD-10-CM

## 2018-04-30 DIAGNOSIS — G47.00 INSOMNIA, UNSPECIFIED TYPE: ICD-10-CM

## 2018-04-30 LAB — INTERPRETATION MONITOR -MUSE: NORMAL

## 2018-04-30 PROCEDURE — 99214 OFFICE O/P EST MOD 30 MIN: CPT | Performed by: PHYSICIAN ASSISTANT

## 2018-04-30 NOTE — NURSING NOTE
"Chief Complaint   Patient presents with     CPAP Follow Up       Initial /72  Pulse 59  Resp 16  Ht 1.778 m (5' 10\")  Wt 86.9 kg (191 lb 9.6 oz)  SpO2 96%  BMI 27.49 kg/m2 Estimated body mass index is 27.49 kg/(m^2) as calculated from the following:    Height as of this encounter: 1.778 m (5' 10\").    Weight as of this encounter: 86.9 kg (191 lb 9.6 oz).  Medication Reconciliation: complete     ESS 2  Gely Soares    "

## 2018-04-30 NOTE — MR AVS SNAPSHOT
After Visit Summary   4/30/2018    Adryan Avila    MRN: [id number]           Patient Information     Date Of Birth          1935        Visit Information        Provider Department      4/30/2018 8:30 AM Goltz, Bennett Ezra, PA-C [facility]        Today's Diagnoses     Obstructive sleep apnea syndrome    -  1      Care Instructions      Your BMI is Body mass index is 27.49 kg/(m^2).  Weight management is a personal decision.  If you are interested in exploring weight loss strategies, the following discussion covers the approaches that may be successful. Body mass index (BMI) is one way to tell whether you are at a healthy weight, overweight, or obese. It measures your weight in relation to your height.  A BMI of 18.5 to 24.9 is in the healthy range. A person with a BMI of 25 to 29.9 is considered overweight, and someone with a BMI of 30 or greater is considered obese. More than two-thirds of American adults are considered overweight or obese.  Being overweight or obese increases the risk for further weight gain. Excess weight may lead to heart disease and diabetes.  Creating and following plans for healthy eating and physical activity may help you improve your health.  Weight control is part of healthy lifestyle and includes exercise, emotional health, and healthy eating habits. Careful eating habits lifelong are the mainstay of weight control. Though there are significant health benefits from weight loss, long-term weight loss with diet alone may be very difficult to achieve- studies show long-term success with dietary management in less than 10% of people. Attaining a healthy weight may be especially difficult to achieve in those with severe obesity. In some cases, medications, devices and surgical management might be considered.  What can you do?  If you are overweight or obese and are interested in methods for weight loss, you should discuss this with your provider.      Consider reducing daily calorie intake by 500 calories.     Keep a food journal.     Avoiding skipping meals, consider cutting portions instead.    Diet combined with exercise helps maintain muscle while optimizing fat loss. Strength training is particularly important for building and maintaining muscle mass. Exercise helps reduce stress, increase energy, and improves fitness. Increasing exercise without diet control, however, may not burn enough calories to loose weight.       Start walking three days a week 10-20 minutes at a time    Work towards walking thirty minutes five days a week     Eventually, increase the speed of your walking for 1-2 minutes at time    In addition, we recommend that you review healthy lifestyles and methods for weight loss available through the National Institutes of Health patient information sites:  http://win.niddk.nih.gov/publications/index.htm    And look into health and wellness programs that may be available through your health insurance provider, employer, local community center, or van club.                Follow-ups after your visit        Additional Services     SLEEP DENTAL REFERRAL       Dental appliance resources recommended by [facility]     Below is a list of Medicare-approved dental appliance resources recommended by [facility].  If you wish to choose your own dental sleep dentist, you may identify a provider close to you: [email or web address]    [facility]   Office 1   Chad Lewis DDS - [DME Medicare]  [address], [address]   [place] [address]  Appointments: [phone]  Fax: [phone]  Website: [employer]    [facility]   Office 2  Chad Lewis DDS - [DME Medicare]  [address], [address]  [place] [address]  Appointments: [phone]  Fax: [phone]  Website: [employer]    [facility]   [place] Office   Pawel Leger DDS, MS -  [DME Medicare]  [facility]   [address], [address]   [place] [address]   Appointments: [phone]   Fax: [phone]   Website: [employer]     Larry Ortiz, DMD, MSD - [DME Medicare]  [address], [address]  [place] [address]  Appointments [phone]  Fax: [phone]  Website: [employer]    If you wish to choose your own dental sleep dentist, you may identify a provider close to you: [email or web address]      AHI: 17/hr PSG DATE: 10/12/2016     Return to clinic in 3 months for Home Sleep Test to confirm adequacy of Treatment.    Other information regarding this referral: Mandibular repositioning appliance for EZEQUIEL. If your insurance asks for a CPT code, it is .    Please be aware that coverage of these services is subject to the terms and limitations of your health insurance plan.  Call member services at your health plan with any benefit or coverage questions.      Please bring the following to your appointment:    >>   List of current medications   >>   This referral request   >>   Any documents/labs given to you for this referral                  Your next 10 appointments already scheduled     May 10, 2018  8:00 AM CDT   Return Visit with MARTITA [facility] ([facility])    [address]  [place] [id number] [phone] OPT 2            Aug 27, 2018  8:30 AM CDT   Return Sleep Patient with Bennett Ezra Goltz, PA-C   [facility] ([facility])    [address]  [address]  [place] [id number] [phone]              Who to contact     If you have questions or need follow up information about today's clinic visit or your schedule please contact [facility] directly at [phone].  Normal or non-critical lab and imaging results will be  "communicated to you by MyChart, letter or phone within 4 business days after the clinic has received the results. If you do not hear from us within 7 days, please contact the clinic through [facility] or phone. If you have a critical or abnormal lab result, we will notify you by phone as soon as possible.  Submit refill requests through [facility] or call your pharmacy and they will forward the refill request to us. Please allow 3 business days for your refill to be completed.          Additional Information About Your Visit        [facility] Information     [facility] lets you send messages to your doctor, view your test results, renew your prescriptions, schedule appointments and more. To sign up, go to [email or web address] . Click on \"Log in\" on the left side of the screen, which will take you to the Welcome page. Then click on \"Sign up Now\" on the right side of the page.     You will be asked to enter the access code listed below, as well as some personal information. Please follow the directions to create your username and password.     Your access code is: [email or web address]  Expires: 2018  9:08 AM     Your access code will  in 90 days. If you need help or a new code, please call your [facility] clinic or [phone].        Care EveryWhere ID     This is your Care EveryWhere ID. This could be used by other organizations to access your [facility] medical records  [id number]        Your Vitals Were     Pulse Respirations Height Pulse Oximetry BMI (Body Mass Index)       59 16 1.778 m (5' 10\") 96% 27.49 kg/m2        Blood Pressure from Last 3 Encounters:   18 120/72   17 122/63   17 104/64    Weight from Last 3 Encounters:   18 86.9 kg (191 lb 9.6 oz)   17 84.4 kg (186 lb)   17 83.5 kg (184 lb)              We Performed the Following     Comprehensive DME     SLEEP DENTAL REFERRAL        Primary Care Provider Office Phone # Fax #    Martha Charles [phone]       " [facility] [address] [address]        Equal Access to Services     ALINASILVIA MOHIT : Hadii naveen costa Sohelena, wajoannada mahsa, qajesus ericksonmamargaret rutledge, magy waddell. So [facility] [phone].    ATENCIÓN: Si habla español, tiene a white disposición servicios gratuitos de asistencia lingüística. Llame al [phone].    We comply with applicable federal civil rights laws and [place] laws. We do not discriminate on the basis of race, color, national origin, age, disability, sex, sexual orientation, or gender identity.            Thank you!     Thank you for choosing [facility]  for your care. Our goal is always to provide you with excellent care. Hearing back from our patients is one way we can continue to improve our services. Please take a few minutes to complete the written survey that you may receive in the mail after your visit with us. Thank you!             Your Updated Medication List - Protect others around you: Learn how to safely use, store and throw away your medicines at www.disposemymeds.org.          This list is accurate as of 4/30/18  9:08 AM.  Always use your most recent med list.                   Brand Name Dispense Instructions for use Diagnosis    allopurinol 100 MG tablet    ZYLOPRIM     Take 200 mg by mouth daily        amLODIPine 5 MG tablet    NORVASC     Take 5 mg by mouth daily        ASPIRIN EC PO      Take 81 mg by mouth daily        chlorthalidone 50 MG tablet    HYGROTON     Take 25 mg by mouth daily        losartan 100 MG tablet    COZAAR     Take 100 mg by mouth daily        order for DME      DREAMSTATION 5-15 CM/H20 NASAL WISP XL        potassium chloride vinod er    K-DUR     Take 40 mEq by mouth daily 2 x 20 mEq        simvastatin 10 MG tablet    ZOCOR     Take 10 mg by mouth At Bedtime

## 2018-04-30 NOTE — PATIENT INSTRUCTIONS

## 2018-04-30 NOTE — PROGRESS NOTES
Sleep Study Follow-Up Visit:    Date on this visit: 4/30/2018    Adryan Avila comes in today for follow-up of their moderate sleep apnea. His PMH is significant for HTN, gout, and hospitalization for bradycardia in 2016.    His PSG on 10/12/2016 showed an AHI of 17.3/hr, RDI 29/hr. No supine sleep was obtained on the study. He had a PLM arousal index of 21/hr.    He is on auto CPAP 5-15 cm. He presents to see if there is alternative to CPAP. He is not sleeping well with it. He goes to bed around 9 PM and 3 AM.  He used to sleep 9 or 10 PM to 6 AM. Now, when he wakes at 3 AM, he removes the mask and may be in and out of sleep until about 5 AM. He sometimes dozes while watching TV for a few minutes. He feels tired and not well rested. He does not take naps. He has had trouble falling asleep as well. He says that has been going on for a long time. He has been trying melatonin. With that, he falls asleep quickly. He also has tried trazodone 6 mg, but that is too strong and leaves him hung over.    He was using a full face. He tried a nasal mask, but he said it comes off in the night. He says he feels no benefit from using CPAP. He does not think he snores with it. His wife will complain if he does not use it. He does get a dry mouth with CPAP. He does not notice much mask leak. He sometimes wakes from leak. He prefers to sleep on his sides. He denies sleeping supine. He does not feel any pressure. He does use the humidifier.  He has a dental bridge.  He denies restless legs or reports of kicking in his sleep, but he sleeps in a separate room from his wife. He says his sheets are not disheveled in the morning.    The compliance data shows that he has used the CPAP for 27/30 nights, 66.7% of nights for >4 hours.  The 90th% pressure is 7.6 cm.  The average time in large leak is 17 min.  The average nightly usage is 5:05.  The average AHI is 1.9/hr.    He did not bring his CPAP with him, so we could not look at mask  fit. He did not seem to remember being here last December for an appointment.     Past medical/surgical history, family history, social history, medications and allergies were reviewed.      Problem List:  Patient Active Problem List    Diagnosis Date Noted     PVC's (premature ventricular contractions) 04/25/2017     Priority: Medium     Benign essential hypertension 04/25/2017     Priority: Medium     Mixed hyperlipidemia 04/25/2017     Priority: Medium     Obstructive sleep apnea syndrome 04/25/2017     Priority: Medium     Bradycardia 10/03/2016     Priority: Medium        Impression/Plan:    (G47.33) Obstructive sleep apnea syndrome  (primary encounter diagnosis)  Comment: Mr. Avila presents looking for an alternative treatment for his apnea. He does not feel any benefit from CPAP and he is having a hard time sleeping with it lately. His mask is old and he is having leak about 17 minutes per night on average. We reviewed his study again. His sleep was very fragmented which could effect the AHI. His REM AHI was only 5.8/hr, which makes me wonder if his AHI was overestimated due to his incredible sleep fragmentation. The only other potential treatment would be a dental appliance, but I'm not sure how good of a candidate he is for that given he has a bridge.   Plan: Comprehensive DME, SLEEP DENTAL REFERRAL        I gave him a dental referral so that he can talk to a dentist about whether or not he is a candidate for a mandibular advancement device. I wrote a prescription for new supplies. I suggested he look at other masks with [facility]. I also suggested that we may repeat his sleep study to see if we have the right diagnosis. He did not seem interested in that at this time.     (G47.00) Insomnia, unspecified type  Comment: He has been going to bed around 9 PM and waking around 3 AM. He removes the mask and then is in and out of sleep until he gets up around 5-6 AM. He had a high PLM arousal index  on the study, but he denies RLS and is not aware of limb movements at night.  Plan: He was encouraged to try to stay up until closer to 10 PM. Get bright light exposure and physical activity in the evening to help stay awake later.      He will follow up with me in about 3 month(s).     Twenty-five minutes spent with patient, all of which were spent face-to-face counseling, consulting, coordinating plan of care.      Bennett Goltz, PA-C    CC: No ref. provider found

## 2018-05-01 ENCOUNTER — TELEPHONE (OUTPATIENT)
Dept: CARDIOLOGY | Facility: CLINIC | Age: 83
End: 2018-05-01

## 2018-05-02 ENCOUNTER — TELEPHONE (OUTPATIENT)
Dept: SLEEP MEDICINE | Facility: CLINIC | Age: 83
End: 2018-05-02

## 2018-05-02 NOTE — TELEPHONE ENCOUNTER
Patient calling he would like his psg be sent to the dentist office Dr. Cortes referred him to, psg has been faxed to [phone] per patients request.

## 2018-05-02 NOTE — TELEPHONE ENCOUNTER
That would be great, marcial if he can see EP and see me if non EP cardiac issue comes up.    Miguel Ángel

## 2018-05-02 NOTE — TELEPHONE ENCOUNTER
Dr. Watkins -     Your last note in 4/2017 rec'd FU with you and Holter prior so that's how I set it up. Would he happy to have Dr. Salinas and me continue to see him and get to you PRN instead!    Nithya - could you pls switch order and have him FU with me in the next few weeks? Thx! Genoveva

## 2018-05-02 NOTE — TELEPHONE ENCOUNTER
This is ordered by marcial Stover. PVCs present but improved. Since this is predominantly EP issue, I would suggest he see DR Salinas or Marcial Stover and cancel appt with austin and see me prn. Marcial, do you agree.    Miguel Ángel

## 2018-05-11 ENCOUNTER — OFFICE VISIT (OUTPATIENT)
Dept: CARDIOLOGY | Facility: CLINIC | Age: 83
End: 2018-05-11
Payer: MEDICARE

## 2018-05-11 VITALS
HEART RATE: 53 BPM | HEIGHT: 70 IN | BODY MASS INDEX: 27.2 KG/M2 | WEIGHT: 190 LBS | DIASTOLIC BLOOD PRESSURE: 73 MMHG | SYSTOLIC BLOOD PRESSURE: 135 MMHG

## 2018-05-11 DIAGNOSIS — I49.3 PVC'S (PREMATURE VENTRICULAR CONTRACTIONS): Primary | ICD-10-CM

## 2018-05-11 PROCEDURE — 99212 OFFICE O/P EST SF 10 MIN: CPT | Performed by: PHYSICIAN ASSISTANT

## 2018-05-11 NOTE — PROGRESS NOTES
HPI and Plan:   See dictation #[id number]    Orders Placed This Encounter   Procedures     Follow-Up with Electrophysiologist     Holter Monitor 24 hour - Adult       No orders of the defined types were placed in this encounter.      There are no discontinued medications.      Encounter Diagnosis   Name Primary?     PVC's (premature ventricular contractions) Yes       CURRENT MEDICATIONS:  Current Outpatient Prescriptions   Medication Sig Dispense Refill     allopurinol (ZYLOPRIM) 100 MG tablet Take 200 mg by mouth daily       amLODIPine (NORVASC) 5 MG tablet Take 5 mg by mouth daily       ASPIRIN EC PO Take 81 mg by mouth daily       chlorthalidone (HYGROTON) 50 MG tablet Take 25 mg by mouth daily       losartan (COZAAR) 100 MG tablet Take 100 mg by mouth daily       order for DME DREAMSTATION  5-15 CM/H20  NASAL WISP XL       potassium chloride vinod er (K-DUR) Take 40 mEq by mouth daily 2 x 20 mEq       simvastatin (ZOCOR) 10 MG tablet Take 10 mg by mouth At Bedtime         ALLERGIES   No Known Allergies    PAST MEDICAL HISTORY:  Past Medical History:   Diagnosis Date     Hypercholesteremia      Hypertension        PAST SURGICAL HISTORY:  Past Surgical History:   Procedure Laterality Date     ENT SURGERY      perforated ear drum 1985     HERNIA REPAIR         FAMILY HISTORY:  Family History   Problem Relation Age of Onset     Family History Negative Mother      Other - See Comments Father      pulmonary rosanne       SOCIAL HISTORY:  Social History     Social History     Marital status:      Spouse name: N/A     Number of children: N/A     Years of education: N/A     Social History Main Topics     Smoking status: Former Smoker     Packs/day: 0.50     Years: 20.00     Types: Cigarettes     Smokeless tobacco: Never Used      Comment: quit 1970     Alcohol use No     Drug use: No     Sexual activity: Not Asked     Other Topics Concern     Parent/Sibling W/ Cabg, Mi Or Angioplasty Before 65f 55m? No     Caffeine  "Concern No     coffee: 2 cups a day     Sleep Concern No     Stress Concern No     Weight Concern No     Special Diet No     Exercise Yes     walking 3 miles 5-6 days a week     Seat Belt Yes     Social History Narrative       Review of Systems:  Skin:  Negative       Eyes:  Negative      ENT:  Negative      Respiratory:  Positive for sleep apnea;CPAP     Cardiovascular:  Negative for;palpitations;chest pain;lightheadedness;dizziness;edema;exercise intolerance;fatigue;syncope or near-syncope      Gastroenterology: Negative      Genitourinary:  Negative      Musculoskeletal:  Negative      Neurologic:  Negative      Psychiatric:  Negative      Heme/Lymph/Imm:  Negative      Endocrine:  Negative        Physical Exam:  Vitals: /73  Pulse 53  Ht 1.778 m (5' 10\")  Wt 86.2 kg (190 lb)  BMI 27.26 kg/m2    Constitutional:  cooperative, alert and oriented, well developed, well nourished, in no acute distress        Skin:  not assessed this visit          Head:  not assessed this visit        Eyes:  not assessed this visit        Lymph:      ENT:  not assessed this visit        Neck:  not assessed this visit        Respiratory:  not assessed this visit         Cardiac: normal S1 and S2;regular rhythm;no S3 or S4;no murmurs, gallops or rubs detected                not assessed this visit                                        GI:  not assessed this visit        Extremities and Muscular Skeletal:  not assessed this visit              Neurological:  not assessed this visit        Psych:  Alert and Oriented x 3        CC  Martha Charles MD  [facility]  [address] [place] [address]              "

## 2018-05-11 NOTE — PATIENT INSTRUCTIONS
1. Reviewed Holter monitor showing overall good but slightly low heart rate. Only 3% PVCs!!  Before ablation it was ~ 34%!    2. Echo continued to show strong heart!  Normal valves    3. See us again 1 year with repeat monitor and echo but CALL if issues prior!  [phone] (She Alonso and Mohamud)

## 2018-05-11 NOTE — MR AVS SNAPSHOT
After Visit Summary   5/11/2018    Adryan Avila    MRN: [id number]           Patient Information     Date Of Birth          1935        Visit Information        Provider Department      5/11/2018 7:30 AM Sherri Stover PA-C [facility]        Today's Diagnoses     PVC's (premature ventricular contractions)    -  1      Care Instructions    1. Reviewed Holter monitor showing overall good but slightly low heart rate. Only 3% PVCs!!  Before ablation it was ~ 34%!    2. Echo continued to show strong heart!  Normal valves    3. See us again 1 year with repeat monitor and echo but CALL if issues prior!  [phone] (Celeste, Pat and Bill)          Follow-ups after your visit        Additional Services     Follow-Up with Electrophysiologist                 Your next 10 appointments already scheduled     Aug 27, 2018  8:30 AM CDT   Return Sleep Patient with Bennett Ezra Goltz, PA-C   [facility] ([facility])    [address] [id number] [phone]              Future tests that were ordered for you today     Open Future Orders        Priority Expected Expires Ordered    Holter Monitor 24 hour - Adult Routine 5/11/2019 5/12/2019 5/11/2018    Follow-Up with Electrophysiologist Routine 5/11/2019 5/12/2019 5/11/2018            Who to contact     If you have questions or need follow up information about today's clinic visit or your schedule please contact [facility] directly at [phone].  Normal or non-critical lab and imaging results will be communicated to you by MyChart, letter or phone within 4 business days after the clinic has received the results. If you do not hear from us within 7 days, please contact the clinic through MyChart or phone. If you have a critical or abnormal lab result, we will notify you by phone as soon as possible.  Submit  "refill requests through panOpen or call your pharmacy and they will forward the refill request to us. Please allow 3 business days for your refill to be completed.          Additional Information About Your Visit        [employer] Information     panOpen lets you send messages to your doctor, view your test results, renew your prescriptions, schedule appointments and more. To sign up, go to [email or web address] . Click on \"Log in\" on the left side of the screen, which will take you to the Welcome page. Then click on \"Sign up Now\" on the right side of the page.     You will be asked to enter the access code listed below, as well as some personal information. Please follow the directions to create your username and password.     Your access code is: [email or web address]  Expires: 2018  9:08 AM     Your access code will  in 90 days. If you need help or a new code, please call your [place] clinic or [phone].        Care EveryWhere ID     This is your Care EveryWhere ID. This could be used by other organizations to access your [place] medical records  [id number]        Your Vitals Were     Pulse Height BMI (Body Mass Index)             53 1.778 m (5' 10\") 27.26 kg/m2          Blood Pressure from Last 3 Encounters:   18 135/73   18 120/72   17 122/63    Weight from Last 3 Encounters:   18 86.2 kg (190 lb)   18 86.9 kg (191 lb 9.6 oz)   17 84.4 kg (186 lb)               Primary Care Provider Office Phone # Fax #    Martha Charles [phone]       [facility] [address] [address]        Equal Access to Services     ISABEL RUEDA : Nkechi Kulkarni, ella bragg, magy harris. So [facility] [phone].    ATENCIÓN: Si habla español, tiene a white disposición servicios gratuitos de asistencia lingüística. Catalina al [phone].    We comply with applicable federal " civil rights laws and [place] laws. We do not discriminate on the basis of race, color, national origin, age, disability, sex, sexual orientation, or gender identity.            Thank you!     Thank you for choosing [facility]  for your care. Our goal is always to provide you with excellent care. Hearing back from our patients is one way we can continue to improve our services. Please take a few minutes to complete the written survey that you may receive in the mail after your visit with us. Thank you!             Your Updated Medication List - Protect others around you: Learn how to safely use, store and throw away your medicines at www.disposemymeds.org.          This list is accurate as of 5/11/18  7:41 AM.  Always use your most recent med list.                   Brand Name Dispense Instructions for use Diagnosis    allopurinol 100 MG tablet    ZYLOPRIM     Take 200 mg by mouth daily        amLODIPine 5 MG tablet    NORVASC     Take 5 mg by mouth daily        ASPIRIN EC PO      Take 81 mg by mouth daily        chlorthalidone 50 MG tablet    HYGROTON     Take 25 mg by mouth daily        losartan 100 MG tablet    COZAAR     Take 100 mg by mouth daily        order for DME      DREAMSTATION 5-15 CM/H20 NASAL WISP XL        potassium chloride vinod er    K-DUR     Take 40 mEq by mouth daily 2 x 20 mEq        simvastatin 10 MG tablet    ZOCOR     Take 10 mg by mouth At Bedtime

## 2018-05-11 NOTE — LETTER
5/11/2018      Martha Charles MD  [facility] [address] [address]      RE: Adryan HORAN Austin       Dear Colleague,    I had the pleasure of seeing Adryan Avila in the [facility].    Service Date: 05/11/2018      HISTORY OF PRESENT ILLNESS:  I had the pleasure of seeing Adryan today when he came for his test results.  He is a very pleasant 83-year-old who had a significant PVC burden with pseudobradycardia.  Initial burden was roughly 34% and after an ablation by Dr. Salinas to the left ventricular posterior lateral papillary muscle, his PVC burden decreased to 13%.  Dr. Watkins had previously seen him and recommended an echo and Holter in the spring.  Given his predominantly EP related issues, he recommended continued EP followup.      Adryan tells me that since I saw him in December, he has continued to do well.  Specifically, he continues to walk 3 miles per day.  He denies edema, orthopnea, PND, lightheadedness, casi syncope or palpitations.  Denies chest pain, pressure or tightness.  Denies change in exercise tolerance.      Echocardiogram done 04/26 continued to show normal ejection fraction at 60%.  He had normal valvular function.  His Holter monitor worn 04/26 showed an average heart rate of 55 beats per minute and sinus bradycardia.  Minimum was 42 when he was sleeping at about 12:30 in the morning and maximum was 101 at 11 in the morning, at which time he states he was on his walk.  Happily, he only had 2282 PVCs which represented a 3% PVC burden.  He had less than 1% PAC burden.        ASSESSMENT AND PLAN:     1.  PVCs.  As above, he is status post ablation in 11/2016.  His most recent Holter monitor as above shows only a 3% PVC burden.  He remains asymptomatic and ejection fraction remains stable.  His stress test in 10/2016 showed no evidence of ischemia.     At this time, will make no changes.  He will see Dr. Salinas for electrophysiology  followup in 1 year with a repeat Holter monitor to assess his PVC burden and average heart rate.       2.  Sinus bradycardia.  He has a longstanding history of this.  He has been completely asymptomatic without complaints of fatigue or lightheadedness.  He is not on any rate controlling medications.      Adryan will see Dr. Salinas in a year, but I have instructed him to contact us can we be of any assistance prior. Dr. Watkins has recommended seeing him back for any general cardiology needs.        SHERRI SIMMONS PA-C             D: 2018   T: 2018   MT: RUBIA      Name:     ADRYAN GOMEZ   MRN:      [id number]        Account:      [id number]   :      1935           Service Date: 2018      Document: [id number]           Outpatient Encounter Prescriptions as of 2018   Medication Sig Dispense Refill     allopurinol (ZYLOPRIM) 100 MG tablet Take 200 mg by mouth daily       amLODIPine (NORVASC) 5 MG tablet Take 5 mg by mouth daily       ASPIRIN EC PO Take 81 mg by mouth daily       chlorthalidone (HYGROTON) 50 MG tablet Take 25 mg by mouth daily       losartan (COZAAR) 100 MG tablet Take 100 mg by mouth daily       order for DME DREAMSTATION  5-15 CM/H20  NASAL WISP XL       potassium chloride vinod er (K-DUR) Take 40 mEq by mouth daily 2 x 20 mEq       simvastatin (ZOCOR) 10 MG tablet Take 10 mg by mouth At Bedtime       No facility-administered encounter medications on file as of 2018.        Again, thank you for allowing me to participate in the care of your patient.      Sincerely,    Sherri Simmons PA-C     [facility]

## 2018-05-11 NOTE — LETTER
5/11/2018    Martha Charles MD  [facility] [address] [address]    RE: Adryan Avila       Dear Colleague,    I had the pleasure of seeing Adryan Avila in the [facility].    HPI and Plan:   See dictation #[id number]    Orders Placed This Encounter   Procedures     Follow-Up with Electrophysiologist     Holter Monitor 24 hour - Adult       No orders of the defined types were placed in this encounter.      There are no discontinued medications.      Encounter Diagnosis   Name Primary?     PVC's (premature ventricular contractions) Yes       CURRENT MEDICATIONS:  Current Outpatient Prescriptions   Medication Sig Dispense Refill     allopurinol (ZYLOPRIM) 100 MG tablet Take 200 mg by mouth daily       amLODIPine (NORVASC) 5 MG tablet Take 5 mg by mouth daily       ASPIRIN EC PO Take 81 mg by mouth daily       chlorthalidone (HYGROTON) 50 MG tablet Take 25 mg by mouth daily       losartan (COZAAR) 100 MG tablet Take 100 mg by mouth daily       order for DME DREAMSTATION  5-15 CM/H20  NASAL WISP XL       potassium chloride vinod er (K-DUR) Take 40 mEq by mouth daily 2 x 20 mEq       simvastatin (ZOCOR) 10 MG tablet Take 10 mg by mouth At Bedtime         ALLERGIES   No Known Allergies    PAST MEDICAL HISTORY:  Past Medical History:   Diagnosis Date     Hypercholesteremia      Hypertension        PAST SURGICAL HISTORY:  Past Surgical History:   Procedure Laterality Date     ENT SURGERY      perforated ear drum 1985     HERNIA REPAIR         FAMILY HISTORY:  Family History   Problem Relation Age of Onset     Family History Negative Mother      Other - See Comments Father      pulmonary rosanne       SOCIAL HISTORY:  Social History     Social History     Marital status:      Spouse name: N/A     Number of children: N/A     Years of education: N/A     Social History Main Topics     Smoking status: Former Smoker     Packs/day: 0.50     Years: 20.00     Types:  "Cigarettes     Smokeless tobacco: Never Used      Comment: quit 1970     Alcohol use No     Drug use: No     Sexual activity: Not Asked     Other Topics Concern     Parent/Sibling W/ Cabg, Mi Or Angioplasty Before 65f 55m? No     Caffeine Concern No     coffee: 2 cups a day     Sleep Concern No     Stress Concern No     Weight Concern No     Special Diet No     Exercise Yes     walking 3 miles 5-6 days a week     Seat Belt Yes     Social History Narrative       Review of Systems:  Skin:  Negative       Eyes:  Negative      ENT:  Negative      Respiratory:  Positive for sleep apnea;CPAP     Cardiovascular:  Negative for;palpitations;chest pain;lightheadedness;dizziness;edema;exercise intolerance;fatigue;syncope or near-syncope      Gastroenterology: Negative      Genitourinary:  Negative      Musculoskeletal:  Negative      Neurologic:  Negative      Psychiatric:  Negative      Heme/Lymph/Imm:  Negative      Endocrine:  Negative        Physical Exam:  Vitals: /73  Pulse 53  Ht 1.778 m (5' 10\")  Wt 86.2 kg (190 lb)  BMI 27.26 kg/m2    Constitutional:  cooperative, alert and oriented, well developed, well nourished, in no acute distress        Skin:  not assessed this visit          Head:  not assessed this visit        Eyes:  not assessed this visit        Lymph:      ENT:  not assessed this visit        Neck:  not assessed this visit        Respiratory:  not assessed this visit         Cardiac: normal S1 and S2;regular rhythm;no S3 or S4;no murmurs, gallops or rubs detected                not assessed this visit                                        GI:  not assessed this visit        Extremities and Muscular Skeletal:  not assessed this visit              Neurological:  not assessed this visit        Psych:  Alert and Oriented x 3        CC  Martha Charles MD  [id number] [place] [address]                Thank you for allowing me to participate in the care of your " patient.      Sincerely,     Sherri Stover PA-C     [facility] [place] [facility]    cc:   Martha Charles MD  [facility]  [address] [place] [address]

## 2018-05-11 NOTE — PROGRESS NOTES
Service Date: 05/11/2018      HISTORY OF PRESENT ILLNESS:  I had the pleasure of seeing Adryan today when he came for his test results.  He is a very pleasant 83-year-old who had a significant PVC burden with pseudobradycardia.  Initial burden was roughly 34% and after an ablation by Dr. Salinas to the left ventricular posterior lateral papillary muscle, his PVC burden decreased to 13%.  Dr. Watkins had previously seen him and recommended an echo and Holter in the spring.  Given his predominantly EP related issues, he recommended continued EP followup.      Adryan tells me that since I saw him in December, he has continued to do well.  Specifically, he continues to walk 3 miles per day.  He denies edema, orthopnea, PND, lightheadedness, casi syncope or palpitations.  Denies chest pain, pressure or tightness.  Denies change in exercise tolerance.      Echocardiogram done 04/26 continued to show normal ejection fraction at 60%.  He had normal valvular function.  His Holter monitor worn 04/26 showed an average heart rate of 55 beats per minute and sinus bradycardia.  Minimum was 42 when he was sleeping at about 12:30 in the morning and maximum was 101 at 11 in the morning, at which time he states he was on his walk.  Happily, he only had 2282 PVCs which represented a 3% PVC burden.  He had less than 1% PAC burden.        ASSESSMENT AND PLAN:     1.  PVCs.  As above, he is status post ablation in 11/2016.  His most recent Holter monitor as above shows only a 3% PVC burden.  He remains asymptomatic and ejection fraction remains stable.  His stress test in 10/2016 showed no evidence of ischemia.     At this time, will make no changes.  He will see Dr. Salinas for electrophysiology followup in 1 year with a repeat Holter monitor to assess his PVC burden and average heart rate.       2.  Sinus bradycardia.  He has a longstanding history of this.  He has been completely asymptomatic without complaints of fatigue or lightheadedness.   He is not on any rate controlling medications.      Ladarius will see Dr. Salinas in a year, but I have instructed him to contact us can we be of any assistance prior. Dr. Watkins has recommended seeing him back for any general cardiology needs.        JO SIMMONS PA-C             D: 2018   T: 2018   MT: RUBIA      Name:     LADARIUS GOMEZ   MRN:      [id number]        Account:      [id number]   :      1935           Service Date: 2018      Document: [id number]

## 2018-08-27 ENCOUNTER — OFFICE VISIT (OUTPATIENT)
Dept: SLEEP MEDICINE | Facility: CLINIC | Age: 83
End: 2018-08-27
Payer: MEDICARE

## 2018-08-27 VITALS
TEMPERATURE: 94.5 F | BODY MASS INDEX: 27.2 KG/M2 | SYSTOLIC BLOOD PRESSURE: 128 MMHG | WEIGHT: 190 LBS | OXYGEN SATURATION: 97 % | RESPIRATION RATE: 16 BRPM | DIASTOLIC BLOOD PRESSURE: 68 MMHG | HEIGHT: 70 IN | HEART RATE: 67 BPM

## 2018-08-27 DIAGNOSIS — G47.00 INSOMNIA, UNSPECIFIED TYPE: ICD-10-CM

## 2018-08-27 DIAGNOSIS — G47.33 OBSTRUCTIVE SLEEP APNEA SYNDROME: Primary | ICD-10-CM

## 2018-08-27 PROCEDURE — 99214 OFFICE O/P EST MOD 30 MIN: CPT | Performed by: PHYSICIAN ASSISTANT

## 2018-08-27 NOTE — PROGRESS NOTES
Sleep Study Follow-Up Visit:    Date on this visit: 8/27/2018    Adryan Avila comes in today for follow-up of their moderate sleep apnea. His PMH is significant for HTN, gout, and hospitalization for bradycardia in 2016.     His PSG on 10/12/2016 showed an AHI of 17.3/hr, RDI 29/hr. No supine sleep was obtained on the study. He had a PLM arousal index of 21/hr.     He was on auto CPAP 5-15 cm at his last visit, but was not sleeping as well and feeling no benefit. He was waking at about 3 AM. He was given a referral to dentistry. He says he does not think the dental appliance is working very well. He sleeps 2-3 hours and then wakes briefly. He is more tired in the day. He denies jaw pain. He does snore a little with it. He sometimes forgets to put the mouthguard in. He says he still wakes. His sleep is not much different. His wife has been sleeping in a separate room but [place]. He sleeps on his sides. He was lateral the entire sleep study.    He walks around [facility] daily for exercise.     Bedtime is 9 PM. He reads until 10 PM or a little before 10 PM. He wakes between 1:30-3:30 AM. He does not know why he wakes. He denies getting out of bed. He goes back to sleep fairly quickly. He wakes around 5 AM.  He may stay in bed until as late as 6 AM. He denies napping. He will doze watching TV around midday or whenever he sits down.      He has been taking trazodone in the last couple of weeks. He is still tired in the day if he does not take it. He does sleep better when he takes it.       Past medical/surgical history, family history, social history, medications and allergies were reviewed.      Problem List:  Patient Active Problem List    Diagnosis Date Noted     PVC's (premature ventricular contractions) 04/25/2017     Priority: Medium     Benign essential hypertension 04/25/2017     Priority: Medium     Mixed hyperlipidemia 04/25/2017     Priority: Medium     Obstructive  sleep apnea syndrome 04/25/2017     Priority: Medium     Bradycardia 10/03/2016     Priority: Medium        Impression/Plan:    (G47.33) Obstructive sleep apnea syndrome  (primary encounter diagnosis)  Comment: Adryan has not been very tolerant of CPAP or the dental appliance, primarily because he has not noticed a difference with using them. He denies that either are uncomfortable. His sleep was incredibly fragmented on his initial sleep test, which makes me a little concerned his apnea may be overestimated.   Plan: Comprehensive Sleep Study       I have placed an order to repeat a sleep study. We will get a new baseline and titrate the dental appliance in the second half of the night if the AHI is over 5/hr. Specific orders are: Please start with no treatment to re-assess his baseline. His sleep was very fragmented on his first test. He sleeps laterally, so let him sleep in whatever position he likes. After REM is obtained, if his AHI is >5/hr, have him put in the dental appliance. You may wake him every hour if his AHI is remaining above 5/hr to advance the device. His dentist is in the [facility], but he does not remember the name of the dentist. He was encouraged to take trazodone on the night of the study. I called [facility] and left a message asking for a titration protocol for the study.      (G47.00) Insomnia, unspecified type  Comment: He only wakes once in the night, briefly, but feels tired in the day. He dozes for about 5 minutes watching TV. He sleeps about 10 PM or a little earlier, until 5 AM, but does not get out of bed until 6 AM. He is taking 50 mg trazodone.   Plan: We talked about compressing sleep schedule more,but he did not seem interested in doing that.      He will follow up with me in about 2 week(s).     Twenty-five minutes spent with patient, all of which were spent face-to-face counseling, consulting, coordinating plan of care.      Bennett Goltz, PA-C    CC: No  ref. provider found

## 2018-08-27 NOTE — MR AVS SNAPSHOT
After Visit Summary   8/27/2018    Adryan Avila    MRN: [id number]           Patient Information     Date Of Birth          1935        Visit Information        Provider Department      8/27/2018 8:30 AM Goltz, Bennett Ezra, PA-C [facility]        Today's Diagnoses     Obstructive sleep apnea syndrome    -  1    Insomnia, unspecified type          Care Instructions      Your BMI is Body mass index is 27.26 kg/(m^2).  Weight management is a personal decision.  If you are interested in exploring weight loss strategies, the following discussion covers the approaches that may be successful. Body mass index (BMI) is one way to tell whether you are at a healthy weight, overweight, or obese. It measures your weight in relation to your height.  A BMI of 18.5 to 24.9 is in the healthy range. A person with a BMI of 25 to 29.9 is considered overweight, and someone with a BMI of 30 or greater is considered obese. More than two-thirds of American adults are considered overweight or obese.  Being overweight or obese increases the risk for further weight gain. Excess weight may lead to heart disease and diabetes.  Creating and following plans for healthy eating and physical activity may help you improve your health.  Weight control is part of healthy lifestyle and includes exercise, emotional health, and healthy eating habits. Careful eating habits lifelong are the mainstay of weight control. Though there are significant health benefits from weight loss, long-term weight loss with diet alone may be very difficult to achieve- studies show long-term success with dietary management in less than 10% of people. Attaining a healthy weight may be especially difficult to achieve in those with severe obesity. In some cases, medications, devices and surgical management might be considered.  What can you do?  If you are overweight or obese and are interested in methods for weight loss, you should  discuss this with your provider.     Consider reducing daily calorie intake by 500 calories.     Keep a food journal.     Avoiding skipping meals, consider cutting portions instead.    Diet combined with exercise helps maintain muscle while optimizing fat loss. Strength training is particularly important for building and maintaining muscle mass. Exercise helps reduce stress, increase energy, and improves fitness. Increasing exercise without diet control, however, may not burn enough calories to loose weight.       Start walking three days a week 10-20 minutes at a time    Work towards walking thirty minutes five days a week     Eventually, increase the speed of your walking for 1-2 minutes at time    In addition, we recommend that you review healthy lifestyles and methods for weight loss available through the National Institutes of Health patient information sites:  http://win.niddk.nih.gov/publications/index.htm    And look into health and wellness programs that may be available through your health insurance provider, employer, local community center, or van club.                Follow-ups after your visit        Your next 10 appointments already scheduled     Oct 17, 2018  8:30 PM CDT   PSG Split with BED 6 SH SLEEP   [facility] ([facility])    [address] [address]   [phone]            Oct 31, 2018 10:00 AM CDT   Return Sleep Patient with Bennett Ezra Goltz, PA-C   [facility] ([facility])    [address] [address]   [phone]              Future tests that were ordered for you today     Open Future Orders        Priority Expected Expires Ordered    Comprehensive Sleep Study Routine  2/23/2019 8/27/2018            Who to contact     If you have questions or need follow up information about today's clinic visit or your schedule please contact [facility]  "BIANCA KRUPA directly at [phone].  Normal or non-critical lab and imaging results will be communicated to you by MyChart, letter or phone within 4 business days after the clinic has received the results. If you do not hear from us within 7 days, please contact the clinic through Fuhuhart or phone. If you have a critical or abnormal lab result, we will notify you by phone as soon as possible.  Submit refill requests through Fort[employer] or call your pharmacy and they will forward the refill request to us. Please allow 3 business days for your refill to be completed.          Additional Information About Your Visit        FuhuharProcam TV Information     Fort[employer] lets you send messages to your doctor, view your test results, renew your prescriptions, schedule appointments and more. To sign up, go to [email or web address] . Click on \"Log in\" on the left side of the screen, which will take you to the Welcome page. Then click on \"Sign up Now\" on the right side of the page.     You will be asked to enter the access code listed below, as well as some personal information. Please follow the directions to create your username and password.     Your access code is: [email or web address]  Expires: 2018  9:07 AM     Your access code will  in 90 days. If you need help or a new code, please call your [place] clinic or [phone].        Care EveryWhere ID     This is your Care EveryWhere ID. This could be used by other organizations to access your [place] medical records  [id number]        Your Vitals Were     Pulse Temperature Respirations Height Pulse Oximetry BMI (Body Mass Index)    67 94.5  F (34.7  C) (Oral) 16 1.778 m (5' 10\") 97% 27.26 kg/m2       Blood Pressure from Last 3 Encounters:   18 128/68   18 135/73   18 120/72    Weight from Last 3 Encounters:   18 86.2 kg (190 lb)   18 86.2 kg (190 lb)   18 86.9 kg (191 lb 9.6 oz)               Primary Care Provider Office Phone # Fax #    " Martha Charles [phone]       [facility] [address] [facility] [address]        Equal Access to Services     ISABEL RUEDA : Nkechi Kulkarni, ella bragg, sabrinajesus burtonilirmargaret rutledge, magy santamariain hayaajone cejamega jose davidbabaranupam waddell. So [facility] [phone].    ATENCIÓN: Si habla español, tiene a white disposición servicios gratuitos de asistencia lingüística. Llame al [phone].    We comply with applicable federal civil rights laws and [place] laws. We do not discriminate on the basis of race, color, national origin, age, disability, sex, sexual orientation, or gender identity.            Thank you!     Thank you for choosing [facility]  for your care. Our goal is always to provide you with excellent care. Hearing back from our patients is one way we can continue to improve our services. Please take a few minutes to complete the written survey that you may receive in the mail after your visit with us. Thank you!             Your Updated Medication List - Protect others around you: Learn how to safely use, store and throw away your medicines at www.disposemymeds.org.          This list is accurate as of 8/27/18  9:07 AM.  Always use your most recent med list.                   Brand Name Dispense Instructions for use Diagnosis    allopurinol 100 MG tablet    ZYLOPRIM     Take 200 mg by mouth daily        amLODIPine 5 MG tablet    NORVASC     Take 5 mg by mouth daily        ASPIRIN EC PO      Take 81 mg by mouth daily        chlorthalidone 50 MG tablet    HYGROTON     Take 25 mg by mouth daily        losartan 100 MG tablet    COZAAR     Take 100 mg by mouth daily        order for DME      DREAMSTATION 5-15 CM/H20 NASAL WISP XL        potassium chloride vinod er    K-DUR     Take 40 mEq by mouth daily 2 x 20 mEq        simvastatin 10 MG tablet    ZOCOR     Take 10 mg by mouth At Bedtime

## 2018-08-27 NOTE — NURSING NOTE
"Chief Complaint   Patient presents with     CPAP Follow Up     Dental appliance not working very well, wants to go back on CPAP.       Initial /68  Pulse 67  Temp 94.5  F (34.7  C) (Oral)  Resp 16  Ht 1.778 m (5' 10\")  Wt 86.2 kg (190 lb)  SpO2 97%  BMI 27.26 kg/m2 Estimated body mass index is 27.26 kg/(m^2) as calculated from the following:    Height as of this encounter: 1.778 m (5' 10\").    Weight as of this encounter: 86.2 kg (190 lb).    Medication Reconciliation: complete     ESS 2  Gely Soares        "

## 2018-08-27 NOTE — PATIENT INSTRUCTIONS

## 2018-10-27 ENCOUNTER — THERAPY VISIT (OUTPATIENT)
Dept: SLEEP MEDICINE | Facility: CLINIC | Age: 83
End: 2018-10-27
Payer: MEDICARE

## 2018-10-27 DIAGNOSIS — G47.33 OBSTRUCTIVE SLEEP APNEA SYNDROME: ICD-10-CM

## 2018-10-27 PROCEDURE — 95810 POLYSOM 6/> YRS 4/> PARAM: CPT | Performed by: INTERNAL MEDICINE

## 2018-10-27 NOTE — MR AVS SNAPSHOT
"              After Visit Summary   10/27/2018    Adryan Avila    MRN: [id number]           Patient Information     Date Of Birth          1935        Visit Information        Provider Department      10/27/2018 8:30 PM BED 1  SLEEP [facility]        Today's Diagnoses     Obstructive sleep apnea syndrome          Care Instructions    Completed a split night PSG per provider order.    Preliminary AHI >5.  A final therapeutic PAP pressure was achieved. (Dental appliance used)    Supine REM was not seen on therapeutic pressure.    Patient reports feeling refreshed in AM.          Follow-ups after your visit        Your next 10 appointments already scheduled     Oct 31, 2018 10:00 AM CDT   Return Sleep Patient with Bennett Ezra Goltz, PA-C   [facility] ([facility]    [address] [address] [phone]              Who to contact     If you have questions or need follow up information about today's clinic visit or your schedule please contact [facility] directly at [phone].  Normal or non-critical lab and imaging results will be communicated to you by MyChart, letter or phone within 4 business days after the clinic has received the results. If you do not hear from us within 7 days, please contact the clinic through [employer]Cities[employer] or phone. If you have a critical or abnormal lab result, we will notify you by phone as soon as possible.  Submit refill requests through [employer]ot[employer] or call your pharmacy and they will forward the refill request to us. Please allow 3 business days for your refill to be completed.          Additional Information About Your Visit        MyChart Information     Motomotives lets you send messages to your doctor, view your test results, renew your prescriptions, schedule appointments and more. To sign up, go to [email or web address] . Click on \"Log in\" on the left side of the screen, " "which will take you to the Welcome page. Then click on \"Sign up Now\" on the right side of the page.     You will be asked to enter the access code listed below, as well as some personal information. Please follow the directions to create your username and password.     Your access code is: [email or web address]  Expires: 2018  9:07 AM     Your access code will  in 90 days. If you need help or a new code, please call your [place] clinic or [phone].        Care EveryWhere ID     This is your Care EveryWhere ID. This could be used by other organizations to access your [place] medical records  [id number]         Blood Pressure from Last 3 Encounters:   18 128/68   18 135/73   18 120/72    Weight from Last 3 Encounters:   18 86.2 kg (190 lb)   18 86.2 kg (190 lb)   18 86.9 kg (191 lb 9.6 oz)              We Performed the Following     Comprehensive Sleep Study        Primary Care Provider Office Phone # Fax #    Martha Charles [phone]       [place] [address] [facility] [address]        Equal Access to Services     ISABEL RUEDA AH: Hadii naveen ku hadasho Sojeannetteali, waaxda luqadaha, qaybta kaalmada adeegyada, magy waddell. So [facility] [phone].    ATENCIÓN: Si habla español, tiene a white disposición servicios gratuitos de asistencia lingüística. Llmoe al [phone].    We comply with applicable federal civil rights laws and [place] laws. We do not discriminate on the basis of race, color, national origin, age, disability, sex, sexual orientation, or gender identity.            Thank you!     Thank you for choosing [facility]  for your care. Our goal is always to provide you with excellent care. Hearing back from our patients is one way we can continue to improve our services. Please take a few minutes to complete the written survey that you may receive in the mail after your visit with us. " Thank you!             Your Updated Medication List - Protect others around you: Learn how to safely use, store and throw away your medicines at www.disposemymeds.org.          This list is accurate as of 10/27/18 11:59 PM.  Always use your most recent med list.                   Brand Name Dispense Instructions for use Diagnosis    allopurinol 100 MG tablet    ZYLOPRIM     Take 200 mg by mouth daily        amLODIPine 5 MG tablet    NORVASC     Take 5 mg by mouth daily        ASPIRIN EC PO      Take 81 mg by mouth daily        chlorthalidone 50 MG tablet    HYGROTON     Take 25 mg by mouth daily        losartan 100 MG tablet    COZAAR     Take 100 mg by mouth daily        order for DME      DREAMSTATION 5-15 CM/H20 NASAL WISP XL        potassium chloride vinod er    K-DUR     Take 40 mEq by mouth daily 2 x 20 mEq        simvastatin 10 MG tablet    ZOCOR     Take 10 mg by mouth At Bedtime

## 2018-10-28 NOTE — PATIENT INSTRUCTIONS
Completed a split night PSG per provider order.    Preliminary AHI >5.  A final therapeutic PAP pressure was achieved. (Dental appliance used)    Supine REM was not seen on therapeutic pressure.    Patient reports feeling refreshed in AM.

## 2018-10-29 NOTE — PROCEDURES
" SLEEP STUDY INTERPRETATION  SPLIT NIGHT STUDY      Patient: LADARIUS GOMEZ  YOB: 1935  Study Date: 10/27/2018  MRN: [id number]  Referring Provider: -  Ordering Provider: Goltz, PA-C, Bennett    Indications for Polysomnography: The patient is a 83 y old Male who is 5' 10\" and weighs 190.0 lbs. His BMI is 27.5, [place] sleepiness scale 2.0 and neck circumference is 0.0 cm. Relevant medical history includes previously diagnosed EZEQUIEL (AHI: 17.3 in 2016), currently on oral appliance. A diagnostic polysomnogram was performed to evaluate for sleep apnea. After 204.0 minutes of sleep time, patient was placed on his oral appliance.    Polysomnogram Data: A full night polysomnogram recorded the standard physiologic parameters including EEG, EOG, EMG, ECG, nasal and oral airflow. Respiratory parameters of chest and abdominal movements were recorded with respiratory inductance plethysmography. Oxygen saturation was recorded by pulse oximetry.  Hypopnea scoring rule used: 1B 4%    Diagnostic PSG  Sleep Architecture: Sleep efficiency was low with long wake after sleep onset.   The total recording time of the polysomnogram was 379.4 minutes. The total sleep time was 204.0 minutes. Sleep latency was normal at 20.9 minutes with the use of a sleep aid. REM latency was 77.5 minutes. Arousal index was increased at 21.5 arousals per hour. Sleep efficiency was decreased at 53.8%. Wake after sleep onset was 152.0 minutes. The patient spent 6.1% of total sleep time in Stage N1, 76.5% in Stage N2, 0.0% in Stage N3, and 17.4% in REM. Time in REM supine was - minutes.    Respiration: Mild upper airway resistance in diagnostic portion. Supine REM was not achieved in this portion.     Events ? The polysomnogram revealed a presence of - obstructive, - central, and - mixed apneas resulting in an apnea index of - events per hour. There were 14 obstructive hypopneas and - central hypopneas resulting in an obstructive hypopnea index " of 4.1 and central hypopnea index of - events per hour. The combined apnea/hypopnea index was 4.1 events per hour (central apnea/hypopnea index was - events per hour).  The REM AHI was 20.3 events per hour. The supine AHI was - events per hour. The RERA index was 2.6 events per hour. The RDI was 6.8 events per hour.    Snoring - was reported as loud/intermittent.    Respiratory rate and pattern - was notable for normal respiratory rate and pattern.    Sustained Sleep Associated Hypoventilation - Transcutaneous carbon dioxide monitoring was not used; however significant hypoventilation was not suggested by oximetry.    Sleep Associated Hypoxemia - (Greater than 5 minutes O2 sat at or below 88%) was not present. Baseline oxygen saturation was 92.6%. Lowest oxygen saturation was 85.0%. Time spent less than or equal to 88% was 1.4 minutes. Time spent less than or equal to 89% was 2.3 minutes.     Treatment PSG  Sleep Architecture: Improved with reduction of arousal frequency.   At 04:10:13 AM the patient was placed on PAP treatment and was titrated at pressures ranging from CPAP 1 cmH2O up to CPAP 1 cmH2O. The total recording time of the treatment portion of the study was 117.6 minutes. The total sleep time was 90.5 minutes. During the treatment portion of the study the sleep latency was 2.5 minutes. REM latency was 28.0 minutes. Arousal index was normal at 4.6 arousals per hour. Sleep efficiency was decreased at 76.9%. Wake after sleep onset was 10.5 minutes. The patient spent 8.8% of total sleep time in Stage N1, 48.1% in Stage N2, 0.0% in Stage N3, and 43.1% in REM. Time in REM supine was - minutes.     Respiration:     On oral appliance, the AHI was 1.3 events per hour. Time in REM supine on final pressure was - minutes.     Movement Activity: There was an increased frequency of periodic limb movements of sleep during the diagnostic portion which was not seen during treatment with oral appliance.      Periodic Limb  Movements  o During the diagnostic portion of the study, there were 267 PLMs recorded. The PLM index was 78.5 movements per hour. The PLM Arousal Index was 9.7 per hour.  o During the treatment portion of the study, there were 4 PLMs recorded. The PLM index was 2.7 movements per hour. The PLM Arousal Index was - per hour.    REM EMG Activity - Excessive transient/sustained muscle activity was not present.    Nocturnal Behavior - Abnormal sleep related behaviors were not noted during/arising out of NREM / REM sleep. Bruxism - None apparent.    Cardiac Summary: Sinus rhythm.   During the diagnostic portion of the study, the average pulse rate was 53.3 bpm. The minimum pulse rate was 28.4 bpm while the maximum pulse rate was 77.7 bpm.    During the treatment portion of the study, the average pulse rate was 49.2 bpm. The minimum pulse rate was 30.0 bpm while the maximum pulse rate was 67.8 bpm.     Arrhythmias were not noted.    Assessment:     This sleep study shows a mild degree of upper airway resistance at baseline with an AHI of 4.1 and RDI of 6.8. Sleep was entirely in the lateral position during diagnosis. Underestimation of sleep apnea due to lack of monitoring in supine REM cannot be ruled out.     During the titration portion of test, oral appliance was applied and sleep disordered breathing events were adequately treated.     There was an increased frequency of periodic limb movements of sleep during the diagnostic portion.     Recommendations:    Treatment of EZEQUIEL with dental appliance.    Weight management.     Pharmacologic therapy should be used for management of restless legs syndrome only if present and clinically indicated and not based on the presence of periodic limb movements alone.    Diagnostic Codes:   Obstructive Sleep Apnea G47.33  Repetitive Intrusions Into Sleep F51.8      10/27/2018 [facility] Sleep Study (190.0 lbs) - AHI 4.1, RDI 6.8, Supine AHI -, REM AHI 20.3, Low O2% 85.0%, Time  Spent ?88% 1.4, Time Spent ?89% 2.3. Treatment was titrated to a pressure of CPAP 1 with an AHI 1.3. Time spent in REM supine at this pressure was - minutes.     _____________________________________   Electronically Signed By: Star Cortes MD 10/29/2018

## 2018-10-31 ENCOUNTER — OFFICE VISIT (OUTPATIENT)
Dept: SLEEP MEDICINE | Facility: CLINIC | Age: 83
End: 2018-10-31
Payer: MEDICARE

## 2018-10-31 VITALS
RESPIRATION RATE: 16 BRPM | DIASTOLIC BLOOD PRESSURE: 69 MMHG | BODY MASS INDEX: 27.77 KG/M2 | WEIGHT: 194 LBS | OXYGEN SATURATION: 96 % | SYSTOLIC BLOOD PRESSURE: 113 MMHG | HEART RATE: 53 BPM | HEIGHT: 70 IN

## 2018-10-31 DIAGNOSIS — G47.33 OBSTRUCTIVE SLEEP APNEA SYNDROME: Primary | ICD-10-CM

## 2018-10-31 PROCEDURE — 99214 OFFICE O/P EST MOD 30 MIN: CPT | Performed by: PHYSICIAN ASSISTANT

## 2018-10-31 NOTE — PATIENT INSTRUCTIONS

## 2018-10-31 NOTE — NURSING NOTE
"Chief Complaint   Patient presents with     Sleep Study     Follow up psg        Initial /69  Pulse 53  Resp 16  Ht 1.778 m (5' 10\")  Wt 88 kg (194 lb)  SpO2 96%  BMI 27.84 kg/m2 Estimated body mass index is 27.84 kg/(m^2) as calculated from the following:    Height as of this encounter: 1.778 m (5' 10\").    Weight as of this encounter: 88 kg (194 lb).    Medication Reconciliation: complete    ESS 2    Payton Paula MA    "

## 2018-10-31 NOTE — MR AVS SNAPSHOT
After Visit Summary   10/31/2018    Adryan Avila    MRN: [id number]           Patient Information     Date Of Birth          1935        Visit Information        Provider Department      10/31/2018 10:00 AM Goltz, Bennett Ezra, PA-C [facility]        Today's Diagnoses     Obstructive sleep apnea syndrome    -  1      Care Instructions      Your BMI is Body mass index is 27.84 kg/(m^2).  Weight management is a personal decision.  If you are interested in exploring weight loss strategies, the following discussion covers the approaches that may be successful. Body mass index (BMI) is one way to tell whether you are at a healthy weight, overweight, or obese. It measures your weight in relation to your height.  A BMI of 18.5 to 24.9 is in the healthy range. A person with a BMI of 25 to 29.9 is considered overweight, and someone with a BMI of 30 or greater is considered obese. More than two-thirds of American adults are considered overweight or obese.  Being overweight or obese increases the risk for further weight gain. Excess weight may lead to heart disease and diabetes.  Creating and following plans for healthy eating and physical activity may help you improve your health.  Weight control is part of healthy lifestyle and includes exercise, emotional health, and healthy eating habits. Careful eating habits lifelong are the mainstay of weight control. Though there are significant health benefits from weight loss, long-term weight loss with diet alone may be very difficult to achieve- studies show long-term success with dietary management in less than 10% of people. Attaining a healthy weight may be especially difficult to achieve in those with severe obesity. In some cases, medications, devices and surgical management might be considered.  What can you do?  If you are overweight or obese and are interested in methods for weight loss, you should discuss this with your provider.      Consider reducing daily calorie intake by 500 calories.     Keep a food journal.     Avoiding skipping meals, consider cutting portions instead.    Diet combined with exercise helps maintain muscle while optimizing fat loss. Strength training is particularly important for building and maintaining muscle mass. Exercise helps reduce stress, increase energy, and improves fitness. Increasing exercise without diet control, however, may not burn enough calories to loose weight.       Start walking three days a week 10-20 minutes at a time    Work towards walking thirty minutes five days a week     Eventually, increase the speed of your walking for 1-2 minutes at time    In addition, we recommend that you review healthy lifestyles and methods for weight loss available through the National Institutes of Health patient information sites:  http://win.niddk.nih.gov/publications/index.htm    And look into health and wellness programs that may be available through your health insurance provider, employer, local community center, or van club.    Weight management plan: Patient was referred to their PCP to discuss a diet and exercise plan.              Follow-ups after your visit        Follow-up notes from your care team     Return if symptoms worsen or fail to improve.      Who to contact     If you have questions or need follow up information about today's clinic visit or your schedule please contact [facility] directly at [phone].  Normal or non-critical lab and imaging results will be communicated to you by MyChart, letter or phone within 4 business days after the clinic has received the results. If you do not hear from us within 7 days, please contact the clinic through MyChart or phone. If you have a critical or abnormal lab result, we will notify you by phone as soon as possible.  Submit refill requests through [employer] or call your pharmacy and they will forward the refill request to us. Please  "allow 3 business days for your refill to be completed.          Additional Information About Your Visit        MyChart Information     [employer] lets you send messages to your doctor, view your test results, renew your prescriptions, schedule appointments and more. To sign up, go to [email or web address] . Click on \"Log in\" on the left side of the screen, which will take you to the Welcome page. Then click on \"Sign up Now\" on the right side of the page.     You will be asked to enter the access code listed below, as well as some personal information. Please follow the directions to create your username and password.     Your access code is: [email or web address]  Expires: 2018  9:07 AM     Your access code will  in 90 days. If you need help or a new code, please call your [facility] clinic or [phone].        Care EveryWhere ID     This is your Care EveryWhere ID. This could be used by other organizations to access your [facility] medical records  [id number]        Your Vitals Were     Pulse Respirations Height Pulse Oximetry BMI (Body Mass Index)       53 16 1.778 m (5' 10\") 96% 27.84 kg/m2        Blood Pressure from Last 3 Encounters:   10/31/18 113/69   18 128/68   18 135/73    Weight from Last 3 Encounters:   10/31/18 88 kg (194 lb)   18 86.2 kg (190 lb)   18 86.2 kg (190 lb)              Today, you had the following     No orders found for display       Primary Care Provider Office Phone # Fax #    Martha Charles [phone]       [facility] [address] [address]        Equal Access to Services     [facility] MOHIT AH: Nkechi Kulkarni, ella bragg, rosemarie rutledge, magy waddell. So [facility] [phone].    ATENCIÓN: Si habla español, tiene a white disposición servicios gratuitos de asistencia lingüística. Llame al [phone].    We comply with applicable federal civil rights laws and " [place] laws. We do not discriminate on the basis of race, color, national origin, age, disability, sex, sexual orientation, or gender identity.            Thank you!     Thank you for choosing [facility]  for your care. Our goal is always to provide you with excellent care. Hearing back from our patients is one way we can continue to improve our services. Please take a few minutes to complete the written survey that you may receive in the mail after your visit with us. Thank you!             Your Updated Medication List - Protect others around you: Learn how to safely use, store and throw away your medicines at www.disposemymeds.org.          This list is accurate as of 10/31/18 10:28 AM.  Always use your most recent med list.                   Brand Name Dispense Instructions for use Diagnosis    allopurinol 100 MG tablet    ZYLOPRIM     Take 200 mg by mouth daily        amLODIPine 5 MG tablet    NORVASC     Take 5 mg by mouth daily        ASPIRIN EC PO      Take 81 mg by mouth daily        chlorthalidone 50 MG tablet    HYGROTON     Take 25 mg by mouth daily        losartan 100 MG tablet    COZAAR     Take 100 mg by mouth daily        order for DME      DREAMSTATION 5-15 CM/H20 NASAL WISP XL        potassium chloride vinod er    K-DUR     Take 40 mEq by mouth daily 2 x 20 mEq        simvastatin 10 MG tablet    ZOCOR     Take 10 mg by mouth At Bedtime

## 2018-10-31 NOTE — PROGRESS NOTES
Sleep Study Follow-Up Visit:    Date on this visit: 10/31/2018    Adryan Avila comes in today for follow-up of his sleep study done on 10/27/2018 at the [facility] to re-evaluate previously diagnosed moderate EZEQUIEL. His PSG in 2016 showed an AHI of 17.3/hr. He had tried a CPAP and dental appliance and has not been tolerating or benefiting from either.    Sleep latency 20.9 minutes with trazodone before he came to the study.  REM achieved.   REM latency 77.5 minutes.  Sleep efficiency 53.8%. Total sleep time 204 minutes.    Sleep architecture:  Stage 1, 6.1% (5%), stage 2, 76.5% (45-55%), stage 3, 0% (15-20%), stage REM, 17.4% (20-25%).  AHI was 4.1/hr, with desaturations down to 85%. He spent 2.3 minutes below 90% SpO2, 1.4/hr were associated with arousals. RDI 6.8/hr.  REM RDI 23.7/hr, consistent with moderate REM EZEQUIEL.  Supine RDI N/A.  Periodic Limb Movement Index 78.5/hour, 9.7/hr were associated with arousals.  On the dental appliance, his PLM index was 2.7/hr, none associated with arousals.     Dental appliance titration:  Sleep latency 2.5 minutes.  REM latency 28 minutes.  Sleep efficiency 76.9%. Total sleep time 90.5 minutes. Sleep architecture:  Stage 1, 8.8% (5%), stage 2, 48.1% (45-55%), stage 3, 0% (15-20%), stage REM, 43.1% (20-25%).  The dental appliance was not adjusted. These findings were reviewed with the patient.    He did not sleep supine during this study.  As far as he knows, he does not sleep on his back at home. He is not bothered by prolonged awakenings without the dental appliance.     Past medical/surgical history, family history, social history, medications and allergies were reviewed.      Problem List:  Patient Active Problem List    Diagnosis Date Noted     PVC's (premature ventricular contractions) 04/25/2017     Priority: Medium     Benign essential hypertension 04/25/2017     Priority: Medium     Mixed hyperlipidemia 04/25/2017     Priority: Medium     Obstructive  sleep apnea syndrome 04/25/2017     Priority: Medium     Bradycardia 10/03/2016     Priority: Medium        Impression/Plan:    (G47.33) Obstructive sleep apnea syndrome  (primary encounter diagnosis)  Comment: He did not have any significant apnea even without the CPAP.   Plan: He does not need to use the dental appliance or CPAP. He was advised to avoid supine sleep. He was shown the Slumberbump in case he notices waking up on his back. He was advised to try to avoid sleep in the daytime and stay physically active. If he would like to nap, he was advised to keep it short and in the early afternoon. He may then need to reduce his time in bed at night if he starts noticing difficulty falling asleep or staying asleep.      He will follow up with me in the future as needed.     Twenty-five minutes spent with patient, all of which were spent face-to-face counseling, consulting, coordinating plan of care.      Bennett Goltz, PA-C    CC: No ref. provider found

## 2023-01-01 ENCOUNTER — LAB REQUISITION (OUTPATIENT)
Dept: LAB | Facility: CLINIC | Age: 88
End: 2023-01-01
Payer: MEDICARE

## 2023-01-01 DIAGNOSIS — G47.51 CONFUSIONAL AROUSALS: ICD-10-CM

## 2023-01-01 DIAGNOSIS — R31.9 HEMATURIA, UNSPECIFIED: ICD-10-CM

## 2023-01-01 LAB
ANION GAP SERPL CALCULATED.3IONS-SCNC: 12 MMOL/L (ref 7–15)
BUN SERPL-MCNC: 18.9 MG/DL (ref 8–23)
CALCIUM SERPL-MCNC: 9 MG/DL (ref 8.8–10.2)
CHLORIDE SERPL-SCNC: 108 MMOL/L (ref 98–107)
CREAT SERPL-MCNC: 0.75 MG/DL (ref 0.67–1.17)
DEPRECATED HCO3 PLAS-SCNC: 20 MMOL/L (ref 22–29)
EGFRCR SERPLBLD CKD-EPI 2021: 87 ML/MIN/1.73M2
ERYTHROCYTE [DISTWIDTH] IN BLOOD BY AUTOMATED COUNT: 14.6 % (ref 10–15)
ERYTHROCYTE [DISTWIDTH] IN BLOOD BY AUTOMATED COUNT: 14.9 % (ref 10–15)
GLUCOSE SERPL-MCNC: 101 MG/DL (ref 70–99)
HCT VFR BLD AUTO: 33.9 % (ref 40–53)
HCT VFR BLD AUTO: 42.8 % (ref 40–53)
HGB BLD-MCNC: 10.6 G/DL (ref 13.3–17.7)
HGB BLD-MCNC: 13.5 G/DL (ref 13.3–17.7)
MCH RBC QN AUTO: 28.8 PG (ref 26.5–33)
MCH RBC QN AUTO: 29.4 PG (ref 26.5–33)
MCHC RBC AUTO-ENTMCNC: 31.3 G/DL (ref 31.5–36.5)
MCHC RBC AUTO-ENTMCNC: 31.5 G/DL (ref 31.5–36.5)
MCV RBC AUTO: 92 FL (ref 78–100)
MCV RBC AUTO: 93 FL (ref 78–100)
PLATELET # BLD AUTO: 155 10E3/UL (ref 150–450)
PLATELET # BLD AUTO: 178 10E3/UL (ref 150–450)
POTASSIUM SERPL-SCNC: 4.4 MMOL/L (ref 3.4–5.3)
RBC # BLD AUTO: 3.68 10E6/UL (ref 4.4–5.9)
RBC # BLD AUTO: 4.59 10E6/UL (ref 4.4–5.9)
SODIUM SERPL-SCNC: 140 MMOL/L (ref 135–145)
WBC # BLD AUTO: 16.3 10E3/UL (ref 4–11)
WBC # BLD AUTO: 7.3 10E3/UL (ref 4–11)

## 2023-01-01 PROCEDURE — 36415 COLL VENOUS BLD VENIPUNCTURE: CPT | Mod: ORL | Performed by: NURSE PRACTITIONER

## 2023-01-01 PROCEDURE — 85027 COMPLETE CBC AUTOMATED: CPT | Mod: ORL | Performed by: NURSE PRACTITIONER

## 2023-01-01 PROCEDURE — P9604 ONE-WAY ALLOW PRORATED TRIP: HCPCS | Mod: ORL | Performed by: NURSE PRACTITIONER

## 2023-01-01 PROCEDURE — 80048 BASIC METABOLIC PNL TOTAL CA: CPT | Mod: ORL | Performed by: NURSE PRACTITIONER
